# Patient Record
Sex: FEMALE | Race: OTHER | HISPANIC OR LATINO
[De-identification: names, ages, dates, MRNs, and addresses within clinical notes are randomized per-mention and may not be internally consistent; named-entity substitution may affect disease eponyms.]

---

## 2017-01-23 ENCOUNTER — RX RENEWAL (OUTPATIENT)
Age: 67
End: 2017-01-23

## 2017-02-13 ENCOUNTER — RECORD ABSTRACTING (OUTPATIENT)
Age: 67
End: 2017-02-13

## 2017-02-13 DIAGNOSIS — D64.9 ANEMIA, UNSPECIFIED: ICD-10-CM

## 2017-02-13 DIAGNOSIS — Z83.2 FAMILY HISTORY OF DISEASES OF THE BLOOD AND BLOOD-FORMING ORGANS AND CERTAIN DISORDERS INVOLVING THE IMMUNE MECHANISM: ICD-10-CM

## 2017-03-21 ENCOUNTER — APPOINTMENT (OUTPATIENT)
Dept: GASTROENTEROLOGY | Facility: GI CENTER | Age: 67
End: 2017-03-21

## 2017-04-11 ENCOUNTER — APPOINTMENT (OUTPATIENT)
Dept: GASTROENTEROLOGY | Facility: GI CENTER | Age: 67
End: 2017-04-11

## 2017-04-14 ENCOUNTER — APPOINTMENT (OUTPATIENT)
Dept: PULMONOLOGY | Facility: CLINIC | Age: 67
End: 2017-04-14

## 2017-04-14 VITALS — HEIGHT: 58.2 IN | BODY MASS INDEX: 28.23 KG/M2 | DIASTOLIC BLOOD PRESSURE: 68 MMHG | SYSTOLIC BLOOD PRESSURE: 112 MMHG

## 2017-04-14 VITALS — WEIGHT: 136 LBS | BODY MASS INDEX: 28.18 KG/M2

## 2017-05-25 ENCOUNTER — APPOINTMENT (OUTPATIENT)
Dept: GASTROENTEROLOGY | Facility: GI CENTER | Age: 67
End: 2017-05-25

## 2017-06-19 ENCOUNTER — RX RENEWAL (OUTPATIENT)
Age: 67
End: 2017-06-19

## 2017-06-26 ENCOUNTER — RX RENEWAL (OUTPATIENT)
Age: 67
End: 2017-06-26

## 2017-06-27 ENCOUNTER — RX RENEWAL (OUTPATIENT)
Age: 67
End: 2017-06-27

## 2017-06-29 ENCOUNTER — APPOINTMENT (OUTPATIENT)
Dept: GASTROENTEROLOGY | Facility: CLINIC | Age: 67
End: 2017-06-29

## 2017-06-29 VITALS
WEIGHT: 136 LBS | HEART RATE: 79 BPM | DIASTOLIC BLOOD PRESSURE: 66 MMHG | BODY MASS INDEX: 28.55 KG/M2 | OXYGEN SATURATION: 98 % | HEIGHT: 58 IN | SYSTOLIC BLOOD PRESSURE: 91 MMHG

## 2017-07-16 ENCOUNTER — OUTPATIENT (OUTPATIENT)
Dept: OUTPATIENT SERVICES | Facility: HOSPITAL | Age: 67
LOS: 1 days | End: 2017-07-16
Payer: MEDICARE

## 2017-07-16 ENCOUNTER — APPOINTMENT (OUTPATIENT)
Dept: MRI IMAGING | Facility: CLINIC | Age: 67
End: 2017-07-16

## 2017-07-16 DIAGNOSIS — K74.60 UNSPECIFIED CIRRHOSIS OF LIVER: ICD-10-CM

## 2017-07-16 DIAGNOSIS — Z00.8 ENCOUNTER FOR OTHER GENERAL EXAMINATION: ICD-10-CM

## 2017-07-16 PROCEDURE — A9585: CPT

## 2017-07-16 PROCEDURE — 74183 MRI ABD W/O CNTR FLWD CNTR: CPT

## 2017-07-27 ENCOUNTER — OUTPATIENT (OUTPATIENT)
Dept: OUTPATIENT SERVICES | Facility: HOSPITAL | Age: 67
LOS: 1 days | End: 2017-07-27
Payer: MEDICARE

## 2017-07-27 VITALS
WEIGHT: 132.28 LBS | RESPIRATION RATE: 16 BRPM | DIASTOLIC BLOOD PRESSURE: 55 MMHG | TEMPERATURE: 98 F | HEIGHT: 58 IN | HEART RATE: 75 BPM | SYSTOLIC BLOOD PRESSURE: 100 MMHG

## 2017-07-27 DIAGNOSIS — Z98.890 OTHER SPECIFIED POSTPROCEDURAL STATES: Chronic | ICD-10-CM

## 2017-07-27 DIAGNOSIS — Z01.818 ENCOUNTER FOR OTHER PREPROCEDURAL EXAMINATION: ICD-10-CM

## 2017-07-27 DIAGNOSIS — D50.0 IRON DEFICIENCY ANEMIA SECONDARY TO BLOOD LOSS (CHRONIC): ICD-10-CM

## 2017-07-27 LAB
ALBUMIN SERPL ELPH-MCNC: 3.8 G/DL — SIGNIFICANT CHANGE UP (ref 3.3–5.2)
ALP SERPL-CCNC: 153 U/L — HIGH (ref 40–120)
ALT FLD-CCNC: 19 U/L — SIGNIFICANT CHANGE UP
ANION GAP SERPL CALC-SCNC: 12 MMOL/L — SIGNIFICANT CHANGE UP (ref 5–17)
APTT BLD: 47.7 SEC — HIGH (ref 27.5–37.4)
AST SERPL-CCNC: 25 U/L — SIGNIFICANT CHANGE UP
BILIRUB SERPL-MCNC: 1.1 MG/DL — SIGNIFICANT CHANGE UP (ref 0.4–2)
BUN SERPL-MCNC: 17 MG/DL — SIGNIFICANT CHANGE UP (ref 8–20)
CALCIUM SERPL-MCNC: 9.1 MG/DL — SIGNIFICANT CHANGE UP (ref 8.6–10.2)
CHLORIDE SERPL-SCNC: 101 MMOL/L — SIGNIFICANT CHANGE UP (ref 98–107)
CO2 SERPL-SCNC: 25 MMOL/L — SIGNIFICANT CHANGE UP (ref 22–29)
CREAT SERPL-MCNC: 0.47 MG/DL — LOW (ref 0.5–1.3)
GLUCOSE SERPL-MCNC: 84 MG/DL — SIGNIFICANT CHANGE UP (ref 70–115)
HCT VFR BLD CALC: 28.7 % — LOW (ref 37–47)
HGB BLD-MCNC: 9 G/DL — LOW (ref 12–16)
INR BLD: 1.15 RATIO — SIGNIFICANT CHANGE UP (ref 0.88–1.16)
MCHC RBC-ENTMCNC: 28.5 PG — SIGNIFICANT CHANGE UP (ref 27–31)
MCHC RBC-ENTMCNC: 31.4 G/DL — LOW (ref 32–36)
MCV RBC AUTO: 90.8 FL — SIGNIFICANT CHANGE UP (ref 81–99)
PLATELET # BLD AUTO: 238 K/UL — SIGNIFICANT CHANGE UP (ref 150–400)
POTASSIUM SERPL-MCNC: 4.4 MMOL/L — SIGNIFICANT CHANGE UP (ref 3.5–5.3)
POTASSIUM SERPL-SCNC: 4.4 MMOL/L — SIGNIFICANT CHANGE UP (ref 3.5–5.3)
PROT SERPL-MCNC: 8.7 G/DL — SIGNIFICANT CHANGE UP (ref 6.6–8.7)
PROTHROM AB SERPL-ACNC: 12.7 SEC — SIGNIFICANT CHANGE UP (ref 9.8–12.7)
RBC # BLD: 3.16 M/UL — LOW (ref 4.4–5.2)
RBC # FLD: 15.5 % — SIGNIFICANT CHANGE UP (ref 11–15.6)
SODIUM SERPL-SCNC: 138 MMOL/L — SIGNIFICANT CHANGE UP (ref 135–145)
WBC # BLD: 3.9 K/UL — LOW (ref 4.8–10.8)
WBC # FLD AUTO: 3.9 K/UL — LOW (ref 4.8–10.8)

## 2017-07-27 PROCEDURE — G0463: CPT

## 2017-07-27 PROCEDURE — 93010 ELECTROCARDIOGRAM REPORT: CPT

## 2017-07-27 PROCEDURE — 93005 ELECTROCARDIOGRAM TRACING: CPT

## 2017-07-27 PROCEDURE — T1013: CPT

## 2017-07-27 PROCEDURE — 80053 COMPREHEN METABOLIC PANEL: CPT

## 2017-07-27 PROCEDURE — 85027 COMPLETE CBC AUTOMATED: CPT

## 2017-07-27 PROCEDURE — 85610 PROTHROMBIN TIME: CPT

## 2017-07-27 PROCEDURE — 36415 COLL VENOUS BLD VENIPUNCTURE: CPT

## 2017-07-27 PROCEDURE — 85730 THROMBOPLASTIN TIME PARTIAL: CPT

## 2017-07-27 RX ORDER — SUCRALFATE 1 G
10 TABLET ORAL
Qty: 0 | Refills: 0 | COMMUNITY

## 2017-07-27 RX ORDER — FUROSEMIDE 40 MG
1 TABLET ORAL
Qty: 0 | Refills: 0 | COMMUNITY

## 2017-07-27 RX ORDER — FOLIC ACID 0.8 MG
1 TABLET ORAL
Qty: 0 | Refills: 0 | COMMUNITY

## 2017-07-27 RX ORDER — LEVOCETIRIZINE DIHYDROCHLORIDE 0.5 MG/ML
1 SOLUTION ORAL
Qty: 0 | Refills: 0 | COMMUNITY

## 2017-07-27 RX ORDER — OMEPRAZOLE 10 MG/1
1 CAPSULE, DELAYED RELEASE ORAL
Qty: 0 | Refills: 0 | COMMUNITY

## 2017-07-27 RX ORDER — SPIRONOLACTONE 25 MG/1
1 TABLET, FILM COATED ORAL
Qty: 0 | Refills: 0 | COMMUNITY

## 2017-07-27 RX ORDER — CARVEDILOL PHOSPHATE 80 MG/1
1 CAPSULE, EXTENDED RELEASE ORAL
Qty: 0 | Refills: 0 | COMMUNITY

## 2017-07-27 RX ORDER — LEVOTHYROXINE SODIUM 125 MCG
1 TABLET ORAL
Qty: 0 | Refills: 0 | COMMUNITY

## 2017-07-27 RX ORDER — BUDESONIDE AND FORMOTEROL FUMARATE DIHYDRATE 160; 4.5 UG/1; UG/1
2 AEROSOL RESPIRATORY (INHALATION)
Qty: 0 | Refills: 0 | COMMUNITY

## 2017-07-27 RX ORDER — ALBUTEROL 90 UG/1
2 AEROSOL, METERED ORAL
Qty: 0 | Refills: 0 | COMMUNITY

## 2017-07-27 NOTE — H&P PST ADULT - NEGATIVE GENERAL SYMPTOMS
no chills/no fatigue/no sweating/no fever/no polyphagia/no polyuria/no malaise/no polydipsia/no anorexia/no weight loss/no weight gain

## 2017-07-27 NOTE — H&P PST ADULT - NEGATIVE CARDIOVASCULAR SYMPTOMS
no claudication/no chest pain/no orthopnea/no dyspnea on exertion/no palpitations/no paroxysmal nocturnal dyspnea/no peripheral edema

## 2017-07-27 NOTE — H&P PST ADULT - NEGATIVE FEMALE-SPECIFIC SYMPTOMS
not applicable/no pelvic pain/no abnormal vaginal bleeding/no dysmenorrhea/no vaginal discharge/no menorrhagia/no irregular menses/no amenorrhea/no spotting

## 2017-07-27 NOTE — H&P PST ADULT - NEUROLOGICAL DETAILS
no spontaneous movement/responds to pain/deep reflexes intact/normal strength/sensation intact/cranial nerves intact/responds to verbal commands/superficial reflexes intact/alert and oriented x 3

## 2017-07-27 NOTE — H&P PST ADULT - NEGATIVE MUSCULOSKELETAL SYMPTOMS
no arthralgia/no myalgia/no back pain/no joint swelling/no arm pain L/no arm pain R/no leg pain R/no neck pain/no muscle weakness/no leg pain L/no muscle cramps

## 2017-07-27 NOTE — H&P PST ADULT - NEGATIVE ENMT SYMPTOMS
no nasal congestion/no post-nasal discharge/no nasal obstruction/no ear pain/no dysphagia/no gum bleeding/no abnormal taste sensation/no vertigo/no recurrent cold sores/no sinus symptoms/no tinnitus/no nasal discharge/no hearing difficulty/no throat pain/no nose bleeds/no dry mouth

## 2017-07-27 NOTE — H&P PST ADULT - NSANTHOSAYNRD_GEN_A_CORE
No. JAKY screening performed.  STOP BANG Legend: 0-2 = LOW Risk; 3-4 = INTERMEDIATE Risk; 5-8 = HIGH Risk

## 2017-07-27 NOTE — H&P PST ADULT - NEGATIVE PSYCHIATRIC SYMPTOMS
no paranoia/no suicidal ideation/no memory loss/no mood swings/no hyperactivity/no agitation/no visual hallucinations/no auditory hallucinations/no anxiety/no insomnia

## 2017-07-27 NOTE — H&P PST ADULT - NEGATIVE NEUROLOGICAL SYMPTOMS
no vertigo/no facial palsy/no focal seizures/no tremors/no loss of sensation/no confusion/no syncope/no paresthesias/no headache/no transient paralysis/no generalized seizures/no loss of consciousness/no hemiparesis/no difficulty walking/no weakness

## 2017-07-27 NOTE — H&P PST ADULT - PMH
Anemia    Asthma    GERD with esophagitis    Hypertension    Hypothyroid    Liver cirrhosis Anemia    Ascites of liver    Asthma    Depression    GERD with esophagitis    Hepatitis B    Hypertension    Hypothyroid    Liver cirrhosis Anemia    Ascites of liver    Asthma    Depression    Gastric varices    GERD with esophagitis    Hepatitis B    Hypertension    Hypothyroid    Liver cirrhosis    JAKY (obstructive sleep apnea)

## 2017-07-27 NOTE — H&P PST ADULT - RS GEN PE MLT RESP DETAILS PC
no rales/no wheezes/normal/breath sounds equal/no chest wall tenderness/airway patent/good air movement/no rhonchi/respirations non-labored/clear to auscultation bilaterally/no intercostal retractions/no subcutaneous emphysema

## 2017-07-27 NOTE — H&P PST ADULT - NEGATIVE GASTROINTESTINAL SYMPTOMS
no flatulence/no diarrhea/no jaundice/no constipation/no melena/no change in bowel habits/no vomiting/no hematochezia/no abdominal pain/no nausea/no steatorrhea/no hiccoughs

## 2017-07-27 NOTE — H&P PST ADULT - GASTROINTESTINAL DETAILS
no rigidity/no masses palpable/no bruit/no guarding/no rebound tenderness/bowel sounds normal/no distention/soft/normal/nontender/no organomegaly

## 2017-07-27 NOTE — H&P PST ADULT - NEGATIVE GENERAL GENITOURINARY SYMPTOMS
no dysuria/no incontinence/no renal colic/no flank pain R/no hematuria/normal libido/no bladder infections/no urine discoloration/no nocturia/no flank pain L/no gas in urine/no urinary hesitancy

## 2017-07-27 NOTE — H&P PST ADULT - NEGATIVE BREAST SYMPTOMS
no breast tenderness L/no nipple discharge R/no breast tenderness R/no breast lump R/no breast lump L/no nipple discharge L

## 2017-07-27 NOTE — H&P PST ADULT - NEGATIVE OPHTHALMOLOGIC SYMPTOMS
no blurred vision R/no blurred vision L/no loss of vision R/no scleral injection L/no loss of vision L/no irritation L/no irritation R/no pain L/no lacrimation R/no discharge L/no photophobia/no pain R/no discharge R/no diplopia/no scleral injection R/no lacrimation L

## 2017-07-27 NOTE — H&P PST ADULT - FAMILY HISTORY
Father  Still living? No  Family history of Parkinson's disease, Age at diagnosis: Age Unknown     Mother  Still living? Yes, Estimated age: 81-90  Family history of hyperlipidemia, Age at diagnosis: Age Unknown

## 2017-07-27 NOTE — H&P PST ADULT - MUSCULOSKELETAL
details… detailed exam no joint swelling/normal strength/ROM intact/normal/no calf tenderness/no joint warmth/no joint erythema

## 2017-08-03 ENCOUNTER — RESULT REVIEW (OUTPATIENT)
Age: 67
End: 2017-08-03

## 2017-08-03 ENCOUNTER — APPOINTMENT (OUTPATIENT)
Dept: GASTROENTEROLOGY | Facility: HOSPITAL | Age: 67
End: 2017-08-03

## 2017-08-03 ENCOUNTER — OUTPATIENT (OUTPATIENT)
Dept: OUTPATIENT SERVICES | Facility: HOSPITAL | Age: 67
LOS: 1 days | End: 2017-08-03
Payer: MEDICARE

## 2017-08-03 ENCOUNTER — TRANSCRIPTION ENCOUNTER (OUTPATIENT)
Age: 67
End: 2017-08-03

## 2017-08-03 DIAGNOSIS — D50.0 IRON DEFICIENCY ANEMIA SECONDARY TO BLOOD LOSS (CHRONIC): ICD-10-CM

## 2017-08-03 DIAGNOSIS — Z98.890 OTHER SPECIFIED POSTPROCEDURAL STATES: Chronic | ICD-10-CM

## 2017-08-03 DIAGNOSIS — K64.8 OTHER HEMORRHOIDS: ICD-10-CM

## 2017-08-03 DIAGNOSIS — K57.30 DIVERTICULOSIS OF LARGE INTESTINE W/OUT PERFORATION OR ABSCESS W/OUT BLEEDING: ICD-10-CM

## 2017-08-03 PROCEDURE — 88305 TISSUE EXAM BY PATHOLOGIST: CPT | Mod: 26

## 2017-08-03 PROCEDURE — 45385 COLONOSCOPY W/LESION REMOVAL: CPT

## 2017-08-03 PROCEDURE — T1013: CPT

## 2017-08-03 PROCEDURE — 88305 TISSUE EXAM BY PATHOLOGIST: CPT

## 2017-08-03 PROCEDURE — 43239 EGD BIOPSY SINGLE/MULTIPLE: CPT

## 2017-08-03 PROCEDURE — 43235 EGD DIAGNOSTIC BRUSH WASH: CPT | Mod: 59

## 2017-08-04 LAB — SURGICAL PATHOLOGY FINAL REPORT - CH: SIGNIFICANT CHANGE UP

## 2017-08-07 ENCOUNTER — RX RENEWAL (OUTPATIENT)
Age: 67
End: 2017-08-07

## 2017-08-22 ENCOUNTER — RX RENEWAL (OUTPATIENT)
Age: 67
End: 2017-08-22

## 2017-09-14 ENCOUNTER — APPOINTMENT (OUTPATIENT)
Dept: GASTROENTEROLOGY | Facility: CLINIC | Age: 67
End: 2017-09-14
Payer: MEDICARE

## 2017-09-14 VITALS
RESPIRATION RATE: 16 BRPM | HEIGHT: 58 IN | OXYGEN SATURATION: 100 % | BODY MASS INDEX: 28.13 KG/M2 | SYSTOLIC BLOOD PRESSURE: 98 MMHG | WEIGHT: 134 LBS | DIASTOLIC BLOOD PRESSURE: 67 MMHG

## 2017-09-14 PROCEDURE — 99214 OFFICE O/P EST MOD 30 MIN: CPT

## 2017-09-14 RX ORDER — INHALER, ASSIST DEVICES
SPACER (EA) MISCELLANEOUS
Qty: 1 | Refills: 0 | Status: ACTIVE | COMMUNITY
Start: 2017-08-01

## 2017-10-06 ENCOUNTER — RX RENEWAL (OUTPATIENT)
Age: 67
End: 2017-10-06

## 2017-10-19 ENCOUNTER — APPOINTMENT (OUTPATIENT)
Dept: PULMONOLOGY | Facility: CLINIC | Age: 67
End: 2017-10-19
Payer: MEDICARE

## 2017-10-19 VITALS — WEIGHT: 131 LBS | BODY MASS INDEX: 27.38 KG/M2

## 2017-10-19 VITALS — DIASTOLIC BLOOD PRESSURE: 58 MMHG | HEART RATE: 81 BPM | SYSTOLIC BLOOD PRESSURE: 96 MMHG | OXYGEN SATURATION: 96 %

## 2017-10-19 DIAGNOSIS — R94.2 ABNORMAL RESULTS OF PULMONARY FUNCTION STUDIES: ICD-10-CM

## 2017-10-19 PROCEDURE — 99214 OFFICE O/P EST MOD 30 MIN: CPT | Mod: 25

## 2017-10-19 PROCEDURE — 94010 BREATHING CAPACITY TEST: CPT

## 2017-10-19 RX ORDER — POLYETHYLENE GLYOCOL 3350, SODIUM CHLORIDE, SODIUM BICARBONATE AND POTASSIUM CHLORIDE 420; 11.2; 5.72; 1.48 G/4L; G/4L; G/4L; G/4L
420 POWDER, FOR SOLUTION NASOGASTRIC; ORAL
Qty: 1 | Refills: 0 | Status: DISCONTINUED | COMMUNITY
Start: 2017-06-29 | End: 2017-10-19

## 2017-10-19 RX ORDER — PREDNISONE 10 MG/1
10 TABLET ORAL
Qty: 30 | Refills: 0 | Status: DISCONTINUED | COMMUNITY
Start: 2017-08-11 | End: 2017-10-19

## 2017-12-22 ENCOUNTER — APPOINTMENT (OUTPATIENT)
Dept: GASTROENTEROLOGY | Facility: CLINIC | Age: 67
End: 2017-12-22
Payer: MEDICARE

## 2017-12-22 VITALS
WEIGHT: 131 LBS | BODY MASS INDEX: 27.5 KG/M2 | SYSTOLIC BLOOD PRESSURE: 111 MMHG | DIASTOLIC BLOOD PRESSURE: 72 MMHG | HEART RATE: 73 BPM | HEIGHT: 58 IN

## 2017-12-22 PROCEDURE — 99215 OFFICE O/P EST HI 40 MIN: CPT

## 2017-12-22 RX ORDER — OMEPRAZOLE 20 MG/1
20 CAPSULE, DELAYED RELEASE ORAL TWICE DAILY
Qty: 60 | Refills: 5 | Status: DISCONTINUED | COMMUNITY
Start: 2017-08-07 | End: 2017-12-22

## 2018-01-09 ENCOUNTER — APPOINTMENT (OUTPATIENT)
Dept: GASTROENTEROLOGY | Facility: CLINIC | Age: 68
End: 2018-01-09
Payer: MEDICARE

## 2018-01-09 VITALS
WEIGHT: 130 LBS | HEART RATE: 78 BPM | BODY MASS INDEX: 27.29 KG/M2 | RESPIRATION RATE: 14 BRPM | HEIGHT: 58 IN | SYSTOLIC BLOOD PRESSURE: 113 MMHG | DIASTOLIC BLOOD PRESSURE: 78 MMHG

## 2018-01-09 PROCEDURE — 99214 OFFICE O/P EST MOD 30 MIN: CPT

## 2018-01-18 ENCOUNTER — APPOINTMENT (OUTPATIENT)
Dept: ULTRASOUND IMAGING | Facility: CLINIC | Age: 68
End: 2018-01-18
Payer: MEDICARE

## 2018-01-18 ENCOUNTER — OUTPATIENT (OUTPATIENT)
Dept: OUTPATIENT SERVICES | Facility: HOSPITAL | Age: 68
LOS: 1 days | End: 2018-01-18
Payer: MEDICARE

## 2018-01-18 DIAGNOSIS — Z98.890 OTHER SPECIFIED POSTPROCEDURAL STATES: Chronic | ICD-10-CM

## 2018-01-18 DIAGNOSIS — K74.60 UNSPECIFIED CIRRHOSIS OF LIVER: ICD-10-CM

## 2018-01-18 PROCEDURE — 93975 VASCULAR STUDY: CPT

## 2018-01-18 PROCEDURE — 93975 VASCULAR STUDY: CPT | Mod: 26

## 2018-01-22 ENCOUNTER — RX RENEWAL (OUTPATIENT)
Age: 68
End: 2018-01-22

## 2018-02-01 ENCOUNTER — APPOINTMENT (OUTPATIENT)
Dept: GASTROENTEROLOGY | Facility: CLINIC | Age: 68
End: 2018-02-01
Payer: MEDICARE

## 2018-02-01 VITALS
BODY MASS INDEX: 27.29 KG/M2 | HEIGHT: 58 IN | RESPIRATION RATE: 16 BRPM | WEIGHT: 130 LBS | SYSTOLIC BLOOD PRESSURE: 110 MMHG | HEART RATE: 88 BPM | DIASTOLIC BLOOD PRESSURE: 73 MMHG | OXYGEN SATURATION: 96 %

## 2018-02-01 PROCEDURE — 99214 OFFICE O/P EST MOD 30 MIN: CPT

## 2018-02-01 RX ORDER — LEVOCETIRIZINE DIHYDROCHLORIDE 5 MG/1
5 TABLET ORAL
Qty: 90 | Refills: 0 | Status: COMPLETED | COMMUNITY
Start: 2017-08-07 | End: 2018-02-01

## 2018-02-06 ENCOUNTER — OTHER (OUTPATIENT)
Age: 68
End: 2018-02-06

## 2018-02-14 ENCOUNTER — APPOINTMENT (OUTPATIENT)
Dept: INTERVENTIONAL RADIOLOGY/VASCULAR | Facility: CLINIC | Age: 68
End: 2018-02-14
Payer: MEDICARE

## 2018-02-14 ENCOUNTER — OUTPATIENT (OUTPATIENT)
Dept: OUTPATIENT SERVICES | Facility: HOSPITAL | Age: 68
LOS: 1 days | End: 2018-02-14

## 2018-02-14 DIAGNOSIS — Z98.890 OTHER SPECIFIED POSTPROCEDURAL STATES: Chronic | ICD-10-CM

## 2018-02-14 DIAGNOSIS — Z00.8 ENCOUNTER FOR OTHER GENERAL EXAMINATION: ICD-10-CM

## 2018-02-14 PROCEDURE — 76705 ECHO EXAM OF ABDOMEN: CPT | Mod: 26

## 2018-04-12 ENCOUNTER — APPOINTMENT (OUTPATIENT)
Dept: PULMONOLOGY | Facility: CLINIC | Age: 68
End: 2018-04-12

## 2018-04-23 ENCOUNTER — MEDICATION RENEWAL (OUTPATIENT)
Age: 68
End: 2018-04-23

## 2018-04-25 ENCOUNTER — APPOINTMENT (OUTPATIENT)
Dept: PULMONOLOGY | Facility: CLINIC | Age: 68
End: 2018-04-25
Payer: MEDICARE

## 2018-04-25 VITALS — BODY MASS INDEX: 26.75 KG/M2 | DIASTOLIC BLOOD PRESSURE: 66 MMHG | SYSTOLIC BLOOD PRESSURE: 108 MMHG | WEIGHT: 128 LBS

## 2018-04-25 VITALS — OXYGEN SATURATION: 97 % | HEART RATE: 71 BPM

## 2018-04-25 DIAGNOSIS — K43.9 VENTRAL HERNIA W/OUT OBSTRUCTION OR GANGRENE: ICD-10-CM

## 2018-04-25 PROCEDURE — 99214 OFFICE O/P EST MOD 30 MIN: CPT

## 2018-04-25 RX ORDER — TRIAMCINOLONE ACETONIDE 0.25 MG/G
0.03 CREAM TOPICAL
Qty: 454 | Refills: 0 | Status: DISCONTINUED | COMMUNITY
Start: 2017-07-31 | End: 2018-04-25

## 2018-05-03 ENCOUNTER — OTHER (OUTPATIENT)
Age: 68
End: 2018-05-03

## 2018-05-17 ENCOUNTER — APPOINTMENT (OUTPATIENT)
Dept: INTERVENTIONAL RADIOLOGY/VASCULAR | Facility: CLINIC | Age: 68
End: 2018-05-17

## 2018-08-23 ENCOUNTER — APPOINTMENT (OUTPATIENT)
Dept: GASTROENTEROLOGY | Facility: CLINIC | Age: 68
End: 2018-08-23
Payer: MEDICARE

## 2018-08-23 ENCOUNTER — TRANSCRIPTION ENCOUNTER (OUTPATIENT)
Age: 68
End: 2018-08-23

## 2018-08-23 VITALS
OXYGEN SATURATION: 98 % | DIASTOLIC BLOOD PRESSURE: 60 MMHG | WEIGHT: 128 LBS | HEIGHT: 58 IN | SYSTOLIC BLOOD PRESSURE: 100 MMHG | HEART RATE: 70 BPM | RESPIRATION RATE: 15 BRPM | BODY MASS INDEX: 26.87 KG/M2

## 2018-08-23 DIAGNOSIS — K21.9 GASTRO-ESOPHAGEAL REFLUX DISEASE W/OUT ESOPHAGITIS: ICD-10-CM

## 2018-08-23 PROBLEM — D64.9 ANEMIA, UNSPECIFIED: Chronic | Status: ACTIVE | Noted: 2017-07-27

## 2018-08-23 PROBLEM — E03.9 HYPOTHYROIDISM, UNSPECIFIED: Chronic | Status: ACTIVE | Noted: 2017-07-27

## 2018-08-23 PROBLEM — I86.4 GASTRIC VARICES: Chronic | Status: ACTIVE | Noted: 2017-07-27

## 2018-08-23 PROBLEM — B19.10 UNSPECIFIED VIRAL HEPATITIS B WITHOUT HEPATIC COMA: Chronic | Status: ACTIVE | Noted: 2017-07-27

## 2018-08-23 PROBLEM — G47.33 OBSTRUCTIVE SLEEP APNEA (ADULT) (PEDIATRIC): Chronic | Status: ACTIVE | Noted: 2017-07-27

## 2018-08-23 PROBLEM — J45.909 UNSPECIFIED ASTHMA, UNCOMPLICATED: Chronic | Status: ACTIVE | Noted: 2017-07-27

## 2018-08-23 PROBLEM — K21.0 GASTRO-ESOPHAGEAL REFLUX DISEASE WITH ESOPHAGITIS: Chronic | Status: ACTIVE | Noted: 2017-07-27

## 2018-08-23 PROBLEM — I10 ESSENTIAL (PRIMARY) HYPERTENSION: Chronic | Status: ACTIVE | Noted: 2017-07-27

## 2018-08-23 PROBLEM — R18.8 OTHER ASCITES: Chronic | Status: ACTIVE | Noted: 2017-07-27

## 2018-08-23 PROBLEM — F32.9 MAJOR DEPRESSIVE DISORDER, SINGLE EPISODE, UNSPECIFIED: Chronic | Status: ACTIVE | Noted: 2017-07-27

## 2018-08-23 PROBLEM — K74.60 UNSPECIFIED CIRRHOSIS OF LIVER: Chronic | Status: ACTIVE | Noted: 2017-07-27

## 2018-08-23 PROCEDURE — 99214 OFFICE O/P EST MOD 30 MIN: CPT

## 2018-08-23 RX ORDER — RIVAROXABAN 20 MG/1
20 TABLET, FILM COATED ORAL
Refills: 0 | Status: ACTIVE | COMMUNITY

## 2018-10-08 ENCOUNTER — APPOINTMENT (OUTPATIENT)
Dept: PULMONOLOGY | Facility: CLINIC | Age: 68
End: 2018-10-08
Payer: MEDICARE

## 2018-10-08 VITALS — HEART RATE: 80 BPM | OXYGEN SATURATION: 98 %

## 2018-10-08 VITALS — DIASTOLIC BLOOD PRESSURE: 62 MMHG | SYSTOLIC BLOOD PRESSURE: 108 MMHG

## 2018-10-08 VITALS — WEIGHT: 138 LBS | HEIGHT: 58 IN | BODY MASS INDEX: 28.97 KG/M2

## 2018-10-08 PROCEDURE — 94010 BREATHING CAPACITY TEST: CPT

## 2018-10-08 PROCEDURE — 99215 OFFICE O/P EST HI 40 MIN: CPT | Mod: 25

## 2018-11-01 ENCOUNTER — OUTPATIENT (OUTPATIENT)
Dept: OUTPATIENT SERVICES | Facility: HOSPITAL | Age: 68
LOS: 1 days | End: 2018-11-01

## 2018-11-01 ENCOUNTER — APPOINTMENT (OUTPATIENT)
Dept: INTERVENTIONAL RADIOLOGY/VASCULAR | Facility: CLINIC | Age: 68
End: 2018-11-01
Payer: MEDICARE

## 2018-11-01 ENCOUNTER — RESULT REVIEW (OUTPATIENT)
Age: 68
End: 2018-11-01

## 2018-11-01 DIAGNOSIS — Z98.890 OTHER SPECIFIED POSTPROCEDURAL STATES: Chronic | ICD-10-CM

## 2018-11-01 DIAGNOSIS — J90 PLEURAL EFFUSION, NOT ELSEWHERE CLASSIFIED: ICD-10-CM

## 2018-11-01 PROCEDURE — 32555 ASPIRATE PLEURA W/ IMAGING: CPT | Mod: RT

## 2018-11-01 PROCEDURE — 71045 X-RAY EXAM CHEST 1 VIEW: CPT | Mod: 26

## 2018-11-12 ENCOUNTER — APPOINTMENT (OUTPATIENT)
Dept: PULMONOLOGY | Facility: CLINIC | Age: 68
End: 2018-11-12
Payer: MEDICARE

## 2018-11-12 VITALS — HEART RATE: 100 BPM | DIASTOLIC BLOOD PRESSURE: 58 MMHG | OXYGEN SATURATION: 97 % | SYSTOLIC BLOOD PRESSURE: 98 MMHG

## 2018-11-12 PROCEDURE — 99214 OFFICE O/P EST MOD 30 MIN: CPT

## 2018-11-13 ENCOUNTER — APPOINTMENT (OUTPATIENT)
Dept: GASTROENTEROLOGY | Facility: CLINIC | Age: 68
End: 2018-11-13
Payer: MEDICARE

## 2018-11-13 VITALS
SYSTOLIC BLOOD PRESSURE: 104 MMHG | HEART RATE: 83 BPM | DIASTOLIC BLOOD PRESSURE: 67 MMHG | BODY MASS INDEX: 28.97 KG/M2 | WEIGHT: 138 LBS | HEIGHT: 58 IN

## 2018-11-13 PROCEDURE — 99214 OFFICE O/P EST MOD 30 MIN: CPT

## 2018-12-05 ENCOUNTER — RX RENEWAL (OUTPATIENT)
Age: 68
End: 2018-12-05

## 2018-12-27 ENCOUNTER — OTHER (OUTPATIENT)
Age: 68
End: 2018-12-27

## 2019-01-08 ENCOUNTER — APPOINTMENT (OUTPATIENT)
Dept: GASTROENTEROLOGY | Facility: CLINIC | Age: 69
End: 2019-01-08
Payer: MEDICARE

## 2019-01-08 ENCOUNTER — RX RENEWAL (OUTPATIENT)
Age: 69
End: 2019-01-08

## 2019-01-08 VITALS
WEIGHT: 140 LBS | DIASTOLIC BLOOD PRESSURE: 69 MMHG | HEIGHT: 58 IN | SYSTOLIC BLOOD PRESSURE: 120 MMHG | HEART RATE: 85 BPM | BODY MASS INDEX: 29.39 KG/M2

## 2019-01-08 PROCEDURE — 99214 OFFICE O/P EST MOD 30 MIN: CPT

## 2019-01-08 NOTE — HISTORY OF PRESENT ILLNESS
[None] : had no significant interval events [Heartburn] : denies heartburn [Nausea] : denies nausea [Vomiting] : denies vomiting [Diarrhea] : denies diarrhea [Constipation] : denies constipation [Yellow Skin Or Eyes (Jaundice)] : denies jaundice [Abdominal Pain] : denies abdominal pain [Abdominal Swelling] : denies abdominal swelling [Rectal Pain] : denies rectal pain [Wt Gain ___ Lbs] : no recent weight gain [Wt Loss ___ Lbs] : no recent weight loss [GERD] : no gastroesophageal reflux disease [Hiatus Hernia] : no hiatus hernia [Peptic Ulcer Disease] : no peptic ulcer disease [Pancreatitis] : no pancreatitis [Cholelithiasis] : no cholelithiasis [Kidney Stone] : no kidney stone [Inflammatory Bowel Disease] : no inflammatory bowel disease [Irritable Bowel Syndrome] : no irritable bowel syndrome [Diverticulitis] : no diverticulitis [Alcohol Abuse] : no alcohol abuse [Malignancy] : no malignancy [Abdominal Surgery] : no abdominal surgery [Appendectomy] : no appendectomy [Cholecystectomy] : no cholecystectomy [de-identified] : November 2018 [de-identified] : ordering lab work and renewing medications [de-identified] : Patient presents today for followup evaluation of worsening iron deficiency anemia with a drop in her hemoglobin from 8.5 g to 7.9 g since November of 2018 without associated gross hematochezia or melena or nausea or vomiting or hematemesis. Her last endoscopic evaluation was done in August of 2017 where she had both an upper endoscopy and colonoscopy with polypectomy of 2 small diminutive tubular adenomas. Her upper endoscopy revealed esophageal varices without stigmata of hemorrhage and with no evidence of gastric varices. She is on nonselective beta blockade for her esophageal varices and has no lower GI symptoms or complaints at this time.

## 2019-01-08 NOTE — ASSESSMENT
[FreeTextEntry1] : Impression: Worsening iron deficiency anemia in a patient with known decompensated cirrhosis secondary to nonalcoholic fatty liver disease with no obvious source of GI bleeding rule out possible bleeding esophageal varices and/or lower GI varices.\par \par Recommendations: Upper endoscopy and colonoscopy were advised for further evaluation of the above. These will be done at [facility] on the same day because of her history of decompensated cirrhosis. The risks versus benefits of upper endoscopy and colonoscopy, the one in 1000 risk of possible endoscopic and/or colonic perforation, the risk of possible post biopsy or polypectomy bleeding, the risk of possible respiratory suppression with intravenous sedation, and alternative testing such as upper GI series and/or virtual colonoscopy, were individually explained to both patient and her son today who accompanied the patient both of whom appeared to understand all of the above and were agreeable to proceeding with repeat upper endoscopy and colonoscopy. Her ASA classification is 3. She will be prepped with [facility] for the colonoscopy an is medically optimized for both upper endoscopy and colonoscopy. Both the patient and her son appeared to understand all of the above instructions and management plan.\par

## 2019-01-08 NOTE — PHYSICAL EXAM
[General Appearance - Alert] : alert [General Appearance - In No Acute Distress] : in no acute distress [General Appearance - Well Nourished] : well nourished [General Appearance - Well Developed] : well developed [General Appearance - Well-Appearing] : healthy appearing [Sclera] : the sclera and conjunctiva were normal [PERRL With Normal Accommodation] : pupils were equal in size, round, and reactive to light [Extraocular Movements] : extraocular movements were intact [Outer Ear] : the ears and nose were normal in appearance [Examination Of The Oral Cavity] : the lips and gums were normal [Oropharynx] : the oropharynx was normal [Neck Appearance] : the appearance of the neck was normal [Neck Cervical Mass (___cm)] : no neck mass was observed [Jugular Venous Distention Increased] : there was no jugular-venous distention [Thyroid Diffuse Enlargement] : the thyroid was not enlarged [Thyroid Nodule] : there were no palpable thyroid nodules [Auscultation Breath Sounds / Voice Sounds] : lungs were clear to auscultation bilaterally [Heart Rate And Rhythm] : heart rate was normal and rhythm regular [Heart Sounds] : normal S1 and S2 [Heart Sounds Gallop] : no gallops [Murmurs] : no murmurs [Heart Sounds Pericardial Friction Rub] : no pericardial rub [Bowel Sounds] : normal bowel sounds [Abdomen Soft] : soft [Abdomen Tenderness] : non-tender [] : no hepato-splenomegaly [Abdomen Mass (___ Cm)] : no abdominal mass palpated [Patient Refused] : rectal exam was refused by the patient [No CVA Tenderness] : no ~M costovertebral angle tenderness [Abnormal Walk] : normal gait [Skin Color & Pigmentation] : normal skin color and pigmentation [Skin Turgor] : normal skin turgor [Deep Tendon Reflexes (DTR)] : deep tendon reflexes were 2+ and symmetric [Sensation] : the sensory exam was normal to light touch and pinprick [Motor Exam] : the motor exam was normal [No Focal Deficits] : no focal deficits [Oriented To Time, Place, And Person] : oriented to person, place, and time [FreeTextEntry1] : no asterixis or evidence of encephalopathy

## 2019-02-04 ENCOUNTER — APPOINTMENT (OUTPATIENT)
Dept: PULMONOLOGY | Facility: CLINIC | Age: 69
End: 2019-02-04

## 2019-02-11 ENCOUNTER — APPOINTMENT (OUTPATIENT)
Dept: GASTROENTEROLOGY | Facility: HOSPITAL | Age: 69
End: 2019-02-11

## 2019-02-27 ENCOUNTER — APPOINTMENT (OUTPATIENT)
Dept: PULMONOLOGY | Facility: CLINIC | Age: 69
End: 2019-02-27
Payer: MEDICARE

## 2019-02-27 VITALS
DIASTOLIC BLOOD PRESSURE: 60 MMHG | BODY MASS INDEX: 26.13 KG/M2 | SYSTOLIC BLOOD PRESSURE: 115 MMHG | HEART RATE: 68 BPM | WEIGHT: 125 LBS | OXYGEN SATURATION: 95 %

## 2019-02-27 DIAGNOSIS — Z86.69 PERSONAL HISTORY OF OTHER DISEASES OF THE NERVOUS SYSTEM AND SENSE ORGANS: ICD-10-CM

## 2019-02-27 PROCEDURE — 99215 OFFICE O/P EST HI 40 MIN: CPT

## 2019-02-27 NOTE — REVIEW OF SYSTEMS
[As Noted in HPI] : as noted in HPI [Hepatic Disease] : hepatic disease [Easy Bruising] : a ~M tendency for easy bruising [Negative] : Sleep Disorder

## 2019-02-27 NOTE — REASON FOR VISIT
[Follow-Up - From Hospitalization] : a hospitalization follow-up [Abnormal CT Scan] : abnormal CT Scan [Asthma] : asthma [Shortness of Breath] : shortness of Breath [Family Member] : family member [FreeTextEntry2] : e.coli bacteremia, empyema post VATS,

## 2019-02-27 NOTE — PHYSICAL EXAM
[General Appearance - Well Developed] : well developed [Normal Appearance] : normal appearance [Well Groomed] : well groomed [General Appearance - Well Nourished] : well nourished [No Deformities] : no deformities [General Appearance - In No Acute Distress] : no acute distress [Normal Conjunctiva] : the conjunctiva exhibited no abnormalities [Eyelids - No Xanthelasma] : the eyelids demonstrated no xanthelasmas [Normal Oropharynx] : normal oropharynx [Neck Appearance] : the appearance of the neck was normal [Neck Cervical Mass (___cm)] : no neck mass was observed [Jugular Venous Distention Increased] : there was no jugular-venous distention [Thyroid Diffuse Enlargement] : the thyroid was not enlarged [Thyroid Nodule] : there were no palpable thyroid nodules [Heart Rate And Rhythm] : heart rate and rhythm were normal [Heart Sounds] : normal S1 and S2 [Murmurs] : no murmurs present [Respiration, Rhythm And Depth] : normal respiratory rhythm and effort [Exaggerated Use Of Accessory Muscles For Inspiration] : no accessory muscle use [Auscultation Breath Sounds / Voice Sounds] : lungs were clear to auscultation bilaterally [FreeTextEntry1] : Decreased at right base [Abdomen Soft] : soft [Abnormal Walk] : normal gait [Nail Clubbing] : no clubbing of the fingernails [Cyanosis, Localized] : no localized cyanosis [] : no ischemic changes [Skin Turgor] : normal skin turgor [No Focal Deficits] : no focal deficits [Oriented To Time, Place, And Person] : oriented to person, place, and time [Impaired Insight] : insight and judgment were intact [Affect] : the affect was normal

## 2019-03-04 ENCOUNTER — TRANSCRIPTION ENCOUNTER (OUTPATIENT)
Age: 69
End: 2019-03-04

## 2019-03-18 ENCOUNTER — RX RENEWAL (OUTPATIENT)
Age: 69
End: 2019-03-18

## 2019-03-28 ENCOUNTER — APPOINTMENT (OUTPATIENT)
Dept: PULMONOLOGY | Facility: CLINIC | Age: 69
End: 2019-03-28
Payer: MEDICARE

## 2019-03-28 VITALS
SYSTOLIC BLOOD PRESSURE: 104 MMHG | OXYGEN SATURATION: 98 % | RESPIRATION RATE: 16 BRPM | DIASTOLIC BLOOD PRESSURE: 68 MMHG | HEART RATE: 68 BPM

## 2019-03-28 PROCEDURE — 94010 BREATHING CAPACITY TEST: CPT

## 2019-03-28 PROCEDURE — 99214 OFFICE O/P EST MOD 30 MIN: CPT | Mod: 25

## 2019-03-28 NOTE — PHYSICAL EXAM
[General Appearance - Well Developed] : well developed [Normal Appearance] : normal appearance [Well Groomed] : well groomed [General Appearance - Well Nourished] : well nourished [No Deformities] : no deformities [General Appearance - In No Acute Distress] : no acute distress [Normal Conjunctiva] : the conjunctiva exhibited no abnormalities [Eyelids - No Xanthelasma] : the eyelids demonstrated no xanthelasmas [Normal Oropharynx] : normal oropharynx [Neck Appearance] : the appearance of the neck was normal [Neck Cervical Mass (___cm)] : no neck mass was observed [Jugular Venous Distention Increased] : there was no jugular-venous distention [Thyroid Diffuse Enlargement] : the thyroid was not enlarged [Thyroid Nodule] : there were no palpable thyroid nodules [Heart Rate And Rhythm] : heart rate and rhythm were normal [Heart Sounds] : normal S1 and S2 [Murmurs] : no murmurs present [Respiration, Rhythm And Depth] : normal respiratory rhythm and effort [Exaggerated Use Of Accessory Muscles For Inspiration] : no accessory muscle use [Auscultation Breath Sounds / Voice Sounds] : lungs were clear to auscultation bilaterally [Abdomen Soft] : soft [Abnormal Walk] : normal gait [Nail Clubbing] : no clubbing of the fingernails [Cyanosis, Localized] : no localized cyanosis [] : no ischemic changes [Skin Turgor] : normal skin turgor [No Focal Deficits] : no focal deficits [Oriented To Time, Place, And Person] : oriented to person, place, and time [Impaired Insight] : insight and judgment were intact [Affect] : the affect was normal

## 2019-03-28 NOTE — REASON FOR VISIT
[Follow-Up] : a follow-up visit [Abnormal CT Scan] : abnormal CT Scan [Asthma] : asthma [Family Member] : family member [FreeTextEntry2] : pleural effusion

## 2019-04-08 ENCOUNTER — APPOINTMENT (OUTPATIENT)
Dept: PULMONOLOGY | Facility: CLINIC | Age: 69
End: 2019-04-08

## 2019-06-11 ENCOUNTER — APPOINTMENT (OUTPATIENT)
Dept: OBGYN | Facility: CLINIC | Age: 69
End: 2019-06-11

## 2019-06-21 ENCOUNTER — APPOINTMENT (OUTPATIENT)
Dept: PULMONOLOGY | Facility: CLINIC | Age: 69
End: 2019-06-21
Payer: MEDICARE

## 2019-06-21 VITALS
DIASTOLIC BLOOD PRESSURE: 60 MMHG | OXYGEN SATURATION: 97 % | SYSTOLIC BLOOD PRESSURE: 98 MMHG | RESPIRATION RATE: 14 BRPM | HEART RATE: 81 BPM

## 2019-06-21 VITALS — WEIGHT: 144 LBS | BODY MASS INDEX: 30.1 KG/M2

## 2019-06-21 PROCEDURE — 99214 OFFICE O/P EST MOD 30 MIN: CPT | Mod: 25

## 2019-06-21 PROCEDURE — 94010 BREATHING CAPACITY TEST: CPT

## 2019-06-21 RX ORDER — ACETAMINOPHEN AND CODEINE PHOSPHATE 120; 12 MG/5ML; MG/5ML
120-12 SOLUTION ORAL
Qty: 118 | Refills: 0 | Status: DISCONTINUED | COMMUNITY
Start: 2018-03-22 | End: 2019-06-21

## 2019-06-21 RX ORDER — POLYETHYLENE GLYOCOL 3350, SODIUM CHLORIDE, SODIUM BICARBONATE AND POTASSIUM CHLORIDE 420; 11.2; 5.72; 1.48 G/4L; G/4L; G/4L; G/4L
420 POWDER, FOR SOLUTION NASOGASTRIC; ORAL
Qty: 1 | Refills: 0 | Status: DISCONTINUED | COMMUNITY
Start: 2019-01-08 | End: 2019-06-21

## 2019-06-21 NOTE — REVIEW OF SYSTEMS
[Hepatic Disease] : hepatic disease [Clotting Disorder] : clotting disorder [Negative] : Sleep Disorder

## 2019-06-21 NOTE — PHYSICAL EXAM
[General Appearance - Well Developed] : well developed [Normal Appearance] : normal appearance [Well Groomed] : well groomed [No Deformities] : no deformities [General Appearance - Well Nourished] : well nourished [General Appearance - In No Acute Distress] : no acute distress [Normal Conjunctiva] : the conjunctiva exhibited no abnormalities [Eyelids - No Xanthelasma] : the eyelids demonstrated no xanthelasmas [Normal Oropharynx] : normal oropharynx [Neck Appearance] : the appearance of the neck was normal [Neck Cervical Mass (___cm)] : no neck mass was observed [Jugular Venous Distention Increased] : there was no jugular-venous distention [Thyroid Diffuse Enlargement] : the thyroid was not enlarged [Thyroid Nodule] : there were no palpable thyroid nodules [Heart Rate And Rhythm] : heart rate and rhythm were normal [Heart Sounds] : normal S1 and S2 [Murmurs] : no murmurs present [Respiration, Rhythm And Depth] : normal respiratory rhythm and effort [Exaggerated Use Of Accessory Muscles For Inspiration] : no accessory muscle use [Auscultation Breath Sounds / Voice Sounds] : lungs were clear to auscultation bilaterally [Abdomen Soft] : soft [Abdomen Tenderness] : non-tender [Abdomen Mass (___ Cm)] : no abdominal mass palpated [Abnormal Walk] : normal gait [Gait - Sufficient For Exercise Testing] : the gait was sufficient for exercise testing [Nail Clubbing] : no clubbing of the fingernails [Cyanosis, Localized] : no localized cyanosis [Petechial Hemorrhages (___cm)] : no petechial hemorrhages [] : no ischemic changes [Skin Turgor] : normal skin turgor [Oriented To Time, Place, And Person] : oriented to person, place, and time [No Focal Deficits] : no focal deficits [Impaired Insight] : insight and judgment were intact [Affect] : the affect was normal

## 2019-07-11 ENCOUNTER — APPOINTMENT (OUTPATIENT)
Dept: OBGYN | Facility: CLINIC | Age: 69
End: 2019-07-11
Payer: MEDICARE

## 2019-07-11 VITALS
SYSTOLIC BLOOD PRESSURE: 120 MMHG | BODY MASS INDEX: 30.02 KG/M2 | HEIGHT: 58 IN | DIASTOLIC BLOOD PRESSURE: 74 MMHG | WEIGHT: 143 LBS

## 2019-07-11 LAB
BILIRUB UR QL STRIP: NORMAL
DATE COLLECTED: NORMAL
GLUCOSE UR-MCNC: NORMAL
HCG UR QL: 4 EU/DL
HEMOCCULT SP1 STL QL: NEGATIVE
HGB UR QL STRIP.AUTO: NORMAL
KETONES UR-MCNC: NORMAL
LEUKOCYTE ESTERASE UR QL STRIP: NORMAL
NITRITE UR QL STRIP: NORMAL
PH UR STRIP: 7
PROT UR STRIP-MCNC: ABNORMAL
QUALITY CONTROL: YES
SP GR UR STRIP: 1.02

## 2019-07-11 PROCEDURE — 81003 URINALYSIS AUTO W/O SCOPE: CPT | Mod: QW

## 2019-07-11 PROCEDURE — 99203 OFFICE O/P NEW LOW 30 MIN: CPT | Mod: 25

## 2019-07-11 PROCEDURE — 82270 OCCULT BLOOD FECES: CPT

## 2019-07-11 PROCEDURE — G0101: CPT

## 2019-07-11 NOTE — REVIEW OF SYSTEMS
[Nl] : Hematologic/Lymphatic [Fever] : no fever [Chills] : no chills [Chest Pain] : no chest pain [Dyspnea] : no shortness of breath [Abdominal Pain] : no abdominal pain [Vomiting] : no vomiting [Pelvic Pain] : no pelvic pain [Abn Vag Bleeding] : no abnormal vaginal bleeding

## 2019-07-11 NOTE — DISCUSSION/SUMMARY
[FreeTextEntry1] : F/U pap.\par \par We discussed Guiac positive stool. However she states she had a normal colonoscopy 3 months ago. She was instructed to discuss with her GI doctor as well. Task sent to her GI doctor.

## 2019-07-11 NOTE — HISTORY OF PRESENT ILLNESS
[unknown] : the patient is unsure of the date of her LMP [NA] : N/A [Post-Menopause, No Sxs] : post-menopausal, currently without symptoms [Menarche Age: ____] : age at menarche was [unfilled] [Hot Flashes] : no hot flashes [Night Sweats] : no night sweats [Vaginal Itching] : no vaginal itching [Dyspareunia] : no dyspareunia [Mood Changes] : no mood changes [Sexually Active] : is not sexually active

## 2019-07-11 NOTE — PHYSICAL EXAM
[Awake] : awake [Alert] : alert [Soft] : soft [Oriented x3] : oriented to person, place, and time [Labia Minora] : labia minora [Normal] : uterus [Labia Majora] : labia major [Atrophy] : atrophy [Uterine Adnexae] : were not tender and not enlarged [No Tenderness] : no rectal tenderness [Occult Blood] : occult blood test from digital rectal exam was positive [Acute Distress] : no acute distress [Mass] : no breast mass [Nipple Discharge] : no nipple discharge [Axillary LAD] : no axillary lymphadenopathy [Tender] : non tender [Distended] : not distended [Depressed Mood] : not depressed [Flat Affect] : affect not flat [Tenderness] : nontender

## 2019-07-13 LAB — HPV HIGH+LOW RISK DNA PNL CVX: NOT DETECTED

## 2019-07-26 LAB — CYTOLOGY CVX/VAG DOC THIN PREP: ABNORMAL

## 2019-08-13 ENCOUNTER — RX RENEWAL (OUTPATIENT)
Age: 69
End: 2019-08-13

## 2019-08-30 ENCOUNTER — RX RENEWAL (OUTPATIENT)
Age: 69
End: 2019-08-30

## 2019-09-09 ENCOUNTER — RX RENEWAL (OUTPATIENT)
Age: 69
End: 2019-09-09

## 2019-09-09 ENCOUNTER — RX CHANGE (OUTPATIENT)
Age: 69
End: 2019-09-09

## 2019-12-19 ENCOUNTER — APPOINTMENT (OUTPATIENT)
Dept: PULMONOLOGY | Facility: CLINIC | Age: 69
End: 2019-12-19
Payer: MEDICARE

## 2019-12-19 VITALS
HEIGHT: 58 IN | OXYGEN SATURATION: 99 % | HEART RATE: 80 BPM | BODY MASS INDEX: 30.02 KG/M2 | DIASTOLIC BLOOD PRESSURE: 60 MMHG | SYSTOLIC BLOOD PRESSURE: 100 MMHG | WEIGHT: 143 LBS

## 2019-12-19 VITALS — BODY MASS INDEX: 30.64 KG/M2 | WEIGHT: 146 LBS | HEIGHT: 58 IN

## 2019-12-19 DIAGNOSIS — R09.02 HYPOXEMIA: ICD-10-CM

## 2019-12-19 DIAGNOSIS — Z99.81 HYPOXEMIA: ICD-10-CM

## 2019-12-19 PROCEDURE — 94010 BREATHING CAPACITY TEST: CPT

## 2019-12-19 PROCEDURE — 99214 OFFICE O/P EST MOD 30 MIN: CPT | Mod: 25

## 2019-12-19 RX ORDER — PANTOPRAZOLE 20 MG/1
20 TABLET, DELAYED RELEASE ORAL DAILY
Qty: 30 | Refills: 5 | Status: DISCONTINUED | COMMUNITY
Start: 2017-12-08 | End: 2019-12-19

## 2019-12-19 NOTE — PHYSICAL EXAM
[General Appearance - Well Developed] : well developed [Well Groomed] : well groomed [Normal Appearance] : normal appearance [General Appearance - Well Nourished] : well nourished [No Deformities] : no deformities [General Appearance - In No Acute Distress] : no acute distress [Normal Conjunctiva] : the conjunctiva exhibited no abnormalities [Eyelids - No Xanthelasma] : the eyelids demonstrated no xanthelasmas [Normal Oropharynx] : normal oropharynx [Neck Cervical Mass (___cm)] : no neck mass was observed [Neck Appearance] : the appearance of the neck was normal [Jugular Venous Distention Increased] : there was no jugular-venous distention [Thyroid Diffuse Enlargement] : the thyroid was not enlarged [Heart Sounds] : normal S1 and S2 [Heart Rate And Rhythm] : heart rate and rhythm were normal [Thyroid Nodule] : there were no palpable thyroid nodules [Murmurs] : no murmurs present [Exaggerated Use Of Accessory Muscles For Inspiration] : no accessory muscle use [Respiration, Rhythm And Depth] : normal respiratory rhythm and effort [Auscultation Breath Sounds / Voice Sounds] : lungs were clear to auscultation bilaterally [Abdomen Soft] : soft [Abdomen Tenderness] : non-tender [Abdomen Mass (___ Cm)] : no abdominal mass palpated [Abnormal Walk] : normal gait [Gait - Sufficient For Exercise Testing] : the gait was sufficient for exercise testing [Nail Clubbing] : no clubbing of the fingernails [Cyanosis, Localized] : no localized cyanosis [] : no ischemic changes [Petechial Hemorrhages (___cm)] : no petechial hemorrhages [Skin Turgor] : normal skin turgor [No Focal Deficits] : no focal deficits [Impaired Insight] : insight and judgment were intact [Oriented To Time, Place, And Person] : oriented to person, place, and time [Affect] : the affect was normal

## 2019-12-19 NOTE — REASON FOR VISIT
[Abnormal CT Scan] : abnormal CT Scan [Follow-Up] : a follow-up visit [Family Member] : family member [Asthma] : asthma [FreeTextEntry2] : h/o right pleural effusion

## 2020-01-20 ENCOUNTER — RX RENEWAL (OUTPATIENT)
Age: 70
End: 2020-01-20

## 2020-03-18 ENCOUNTER — APPOINTMENT (OUTPATIENT)
Dept: PULMONOLOGY | Facility: CLINIC | Age: 70
End: 2020-03-18
Payer: MEDICARE

## 2020-03-18 VITALS
BODY MASS INDEX: 31.49 KG/M2 | HEART RATE: 72 BPM | DIASTOLIC BLOOD PRESSURE: 56 MMHG | SYSTOLIC BLOOD PRESSURE: 108 MMHG | WEIGHT: 150 LBS | HEIGHT: 58 IN | OXYGEN SATURATION: 99 %

## 2020-03-18 PROCEDURE — 99214 OFFICE O/P EST MOD 30 MIN: CPT

## 2020-03-18 RX ORDER — POTASSIUM CHLORIDE 1500 MG/1
20 TABLET, FILM COATED, EXTENDED RELEASE ORAL
Qty: 30 | Refills: 5 | Status: ACTIVE | COMMUNITY
Start: 2018-11-13

## 2020-03-18 NOTE — PHYSICAL EXAM
[No Acute Distress] : no acute distress [Normal Oropharynx] : normal oropharynx [Normal Appearance] : normal appearance [No Neck Mass] : no neck mass [Normal Rate/Rhythm] : normal rate/rhythm [Normal S1, S2] : normal s1, s2 [No Murmurs] : no murmurs [No Resp Distress] : no resp distress [No Acc Muscle Use] : no acc muscle use [Normal Rhythm and Effort] : normal rhythm and effort [Clear to Auscultation Bilaterally] : clear to auscultation bilaterally [No Abnormalities] : no abnormalities [Benign] : benign [Normal Gait] : normal gait [No Clubbing] : no clubbing [No Cyanosis] : no cyanosis [No Edema] : no edema [FROM] : FROM [Normal Color/ Pigmentation] : normal color/ pigmentation [No Focal Deficits] : no focal deficits [Oriented x3] : oriented x3 [Normal Affect] : normal affect [TextBox_68] : Decreased minimally at right base

## 2020-06-16 ENCOUNTER — APPOINTMENT (OUTPATIENT)
Dept: GASTROENTEROLOGY | Facility: CLINIC | Age: 70
End: 2020-06-16
Payer: MEDICARE

## 2020-06-16 VITALS
HEART RATE: 57 BPM | HEIGHT: 58 IN | WEIGHT: 154 LBS | BODY MASS INDEX: 32.32 KG/M2 | TEMPERATURE: 98.1 F | SYSTOLIC BLOOD PRESSURE: 98 MMHG | DIASTOLIC BLOOD PRESSURE: 51 MMHG

## 2020-06-16 VITALS — TEMPERATURE: 98.1 F

## 2020-06-16 PROCEDURE — 99214 OFFICE O/P EST MOD 30 MIN: CPT

## 2020-06-16 NOTE — PHYSICAL EXAM
[General Appearance - In No Acute Distress] : in no acute distress [General Appearance - Alert] : alert [General Appearance - Well Developed] : well developed [General Appearance - Well-Appearing] : healthy appearing [General Appearance - Well Nourished] : well nourished [Extraocular Movements] : extraocular movements were intact [PERRL With Normal Accommodation] : pupils were equal in size, round, and reactive to light [Sclera] : the sclera and conjunctiva were normal [Outer Ear] : the ears and nose were normal in appearance [Examination Of The Oral Cavity] : the lips and gums were normal [Oropharynx] : the oropharynx was normal [Neck Cervical Mass (___cm)] : no neck mass was observed [Neck Appearance] : the appearance of the neck was normal [Thyroid Nodule] : there were no palpable thyroid nodules [Jugular Venous Distention Increased] : there was no jugular-venous distention [Thyroid Diffuse Enlargement] : the thyroid was not enlarged [Auscultation Breath Sounds / Voice Sounds] : lungs were clear to auscultation bilaterally [Heart Rate And Rhythm] : heart rate was normal and rhythm regular [Heart Sounds Gallop] : no gallops [Heart Sounds] : normal S1 and S2 [Bowel Sounds] : normal bowel sounds [Heart Sounds Pericardial Friction Rub] : no pericardial rub [Murmurs] : no murmurs [Abdomen Soft] : soft [Abdomen Mass (___ Cm)] : no abdominal mass palpated [] : no hepato-splenomegaly [Abdomen Tenderness] : non-tender [No CVA Tenderness] : no ~M costovertebral angle tenderness [Patient Refused] : rectal exam was refused by the patient [Abnormal Walk] : normal gait [Skin Color & Pigmentation] : normal skin color and pigmentation [Deep Tendon Reflexes (DTR)] : deep tendon reflexes were 2+ and symmetric [Skin Turgor] : normal skin turgor [Sensation] : the sensory exam was normal to light touch and pinprick [Motor Exam] : the motor exam was normal [No Focal Deficits] : no focal deficits [Oriented To Time, Place, And Person] : oriented to person, place, and time [FreeTextEntry1] : nonreducible nontender non-incarcerated umbilical hernia which is small. No shifting dullnessor clinically detectable ascites

## 2020-06-16 NOTE — HISTORY OF PRESENT ILLNESS
[None] : had no significant interval events [Heartburn] : denies heartburn [Constipation] : denies constipation [Nausea] : denies nausea [Vomiting] : denies vomiting [Diarrhea] : denies diarrhea [Abdominal Pain] : denies abdominal pain [Abdominal Swelling] : denies abdominal swelling [Yellow Skin Or Eyes (Jaundice)] : denies jaundice [Rectal Pain] : denies rectal pain [Wt Gain ___ Lbs] : no recent weight gain [Wt Loss ___ Lbs] : no recent weight loss [GERD] : no gastroesophageal reflux disease [Peptic Ulcer Disease] : no peptic ulcer disease [Hiatus Hernia] : no hiatus hernia [Pancreatitis] : no pancreatitis [Kidney Stone] : no kidney stone [Inflammatory Bowel Disease] : no inflammatory bowel disease [Irritable Bowel Syndrome] : no irritable bowel syndrome [Alcohol Abuse] : no alcohol abuse [Diverticulitis] : no diverticulitis [Malignancy] : no malignancy [Abdominal Surgery] : no abdominal surgery [Appendectomy] : no appendectomy [de-identified] : Patient presents today for followup evaluation cryptogenic cirrhosis which is well compensated which had previously been present with ascites but no longer is. She is followed by the hepatology division at [facility] in [place] and recently had a CT of the abdomen and pelvis there last month which showed stable cirrhosis with no evidence of hepatoma or ascites. She has no complaints of abdominal pain or distention or leg swelling or jaundice or nausea or vomiting or hematemesis or melena or hematochezia. Her cirrhosis is likely secondary to nonalcoholic fatty liver disease from previous obesity. She has no history of alcohol abuse and liver serologies were all negative for other forms of chronic liver disease or hepatitis. [de-identified] : January 2019 [Cholecystectomy] : no cholecystectomy

## 2020-06-16 NOTE — ASSESSMENT
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 She had a recent CT scan which showed stable cirrhosis without hepatoma done at [facility] in [place].\par \par Recommendations: Patient was sent for repeat lab work including CMP, CBC with differential, AFP and PT/INR and was told to continue her current medical therapy. She was told to return in 3 months for follow up evaluation or sooner if necessary and both her and her son who accompanied the patient today appeared to understand all of the above instructions, information, and management plan.

## 2020-06-25 ENCOUNTER — APPOINTMENT (OUTPATIENT)
Dept: PULMONOLOGY | Facility: CLINIC | Age: 70
End: 2020-06-25
Payer: MEDICARE

## 2020-06-25 VITALS
HEART RATE: 71 BPM | OXYGEN SATURATION: 97 % | SYSTOLIC BLOOD PRESSURE: 110 MMHG | WEIGHT: 149 LBS | DIASTOLIC BLOOD PRESSURE: 62 MMHG | BODY MASS INDEX: 31.14 KG/M2

## 2020-06-25 PROCEDURE — 99213 OFFICE O/P EST LOW 20 MIN: CPT

## 2020-06-25 RX ORDER — ALBUTEROL SULFATE 90 UG/1
108 (90 BASE) AEROSOL, METERED RESPIRATORY (INHALATION)
Qty: 3 | Refills: 3 | Status: DISCONTINUED | COMMUNITY
End: 2020-06-25

## 2020-06-25 NOTE — PHYSICAL EXAM
[No Acute Distress] : no acute distress [Normal Oropharynx] : normal oropharynx [No Neck Mass] : no neck mass [Normal Appearance] : normal appearance [Normal Rate/Rhythm] : normal rate/rhythm [Normal S1, S2] : normal s1, s2 [No Resp Distress] : no resp distress [No Murmurs] : no murmurs [No Acc Muscle Use] : no acc muscle use [Normal Rhythm and Effort] : normal rhythm and effort [Clear to Auscultation Bilaterally] : clear to auscultation bilaterally [No Abnormalities] : no abnormalities [Normal Gait] : normal gait [Benign] : benign [No Clubbing] : no clubbing [No Cyanosis] : no cyanosis [No Edema] : no edema [FROM] : FROM [No Focal Deficits] : no focal deficits [Normal Color/ Pigmentation] : normal color/ pigmentation [Normal Affect] : normal affect [Oriented x3] : oriented x3 [TextBox_68] : Decreased minimally at right base

## 2020-09-04 ENCOUNTER — LABORATORY RESULT (OUTPATIENT)
Age: 70
End: 2020-09-04

## 2020-09-04 ENCOUNTER — APPOINTMENT (OUTPATIENT)
Dept: OBGYN | Facility: CLINIC | Age: 70
End: 2020-09-04
Payer: MEDICARE

## 2020-09-04 VITALS
SYSTOLIC BLOOD PRESSURE: 100 MMHG | WEIGHT: 154 LBS | DIASTOLIC BLOOD PRESSURE: 62 MMHG | HEIGHT: 58 IN | TEMPERATURE: 97.7 F | BODY MASS INDEX: 32.32 KG/M2

## 2020-09-04 DIAGNOSIS — N95.2 POSTMENOPAUSAL ATROPHIC VAGINITIS: ICD-10-CM

## 2020-09-04 DIAGNOSIS — Z78.9 OTHER SPECIFIED HEALTH STATUS: ICD-10-CM

## 2020-09-04 DIAGNOSIS — R05 COUGH: ICD-10-CM

## 2020-09-04 DIAGNOSIS — K76.0 FATTY (CHANGE OF) LIVER, NOT ELSEWHERE CLASSIFIED: ICD-10-CM

## 2020-09-04 DIAGNOSIS — Z87.19 PERSONAL HISTORY OF OTHER DISEASES OF THE DIGESTIVE SYSTEM: ICD-10-CM

## 2020-09-04 DIAGNOSIS — Z01.419 ENCOUNTER FOR GYNECOLOGICAL EXAMINATION (GENERAL) (ROUTINE) W/OUT ABNORMAL FINDINGS: ICD-10-CM

## 2020-09-04 DIAGNOSIS — Z87.898 PERSONAL HISTORY OF OTHER SPECIFIED CONDITIONS: ICD-10-CM

## 2020-09-04 DIAGNOSIS — J90 PLEURAL EFFUSION, NOT ELSEWHERE CLASSIFIED: ICD-10-CM

## 2020-09-04 DIAGNOSIS — Z86.018 PERSONAL HISTORY OF OTHER BENIGN NEOPLASM: ICD-10-CM

## 2020-09-04 DIAGNOSIS — M17.10 UNILATERAL PRIMARY OSTEOARTHRITIS, UNSPECIFIED KNEE: ICD-10-CM

## 2020-09-04 DIAGNOSIS — D50.0 IRON DEFICIENCY ANEMIA SECONDARY TO BLOOD LOSS (CHRONIC): ICD-10-CM

## 2020-09-04 DIAGNOSIS — Z12.31 ENCOUNTER FOR SCREENING MAMMOGRAM FOR MALIGNANT NEOPLASM OF BREAST: ICD-10-CM

## 2020-09-04 DIAGNOSIS — M25.569 PAIN IN UNSPECIFIED KNEE: ICD-10-CM

## 2020-09-04 DIAGNOSIS — J86.9 PYOTHORAX W/OUT FISTULA: ICD-10-CM

## 2020-09-04 DIAGNOSIS — R10.11 RIGHT UPPER QUADRANT PAIN: ICD-10-CM

## 2020-09-04 DIAGNOSIS — Z86.39 PERSONAL HISTORY OF OTHER ENDOCRINE, NUTRITIONAL AND METABOLIC DISEASE: ICD-10-CM

## 2020-09-04 DIAGNOSIS — R19.5 OTHER FECAL ABNORMALITIES: ICD-10-CM

## 2020-09-04 DIAGNOSIS — Z92.89 PERSONAL HISTORY OF OTHER MEDICAL TREATMENT: ICD-10-CM

## 2020-09-04 DIAGNOSIS — K64.9 UNSPECIFIED HEMORRHOIDS: ICD-10-CM

## 2020-09-04 DIAGNOSIS — Z01.411 ENCOUNTER FOR GYNECOLOGICAL EXAMINATION (GENERAL) (ROUTINE) WITH ABNORMAL FINDINGS: ICD-10-CM

## 2020-09-04 DIAGNOSIS — Z87.09 PERSONAL HISTORY OF OTHER DISEASES OF THE RESPIRATORY SYSTEM: ICD-10-CM

## 2020-09-04 DIAGNOSIS — Z09 ENCOUNTER FOR FOLLOW-UP EXAMINATION AFTER COMPLETED TREATMENT FOR CONDITIONS OTHER THAN MALIGNANT NEOPLASM: ICD-10-CM

## 2020-09-04 PROCEDURE — 82270 OCCULT BLOOD FECES: CPT

## 2020-09-04 PROCEDURE — G0101: CPT

## 2020-09-08 PROBLEM — N95.2 VAGINAL ATROPHY: Status: ACTIVE | Noted: 2019-07-11

## 2020-09-08 PROBLEM — Z78.9 EXERCISES DAILY: Status: ACTIVE | Noted: 2020-09-04

## 2020-09-08 NOTE — PHYSICAL EXAM
[Alert] : alert [Awake] : awake [Mass] : no breast mass [Acute Distress] : no acute distress [Nipple Discharge] : no nipple discharge [Axillary LAD] : no axillary lymphadenopathy [Soft] : soft [Tender] : non tender [Depressed Mood] : not depressed [Distended] : not distended [Oriented x3] : oriented to person, place, and time [Flat Affect] : affect not flat [Normal] : uterus [Labia Majora] : labia major [Labia Minora] : labia minora [Pap Obtained] : a Pap smear was performed [Atrophy] : atrophy [Tenderness] : nontender [Dry Mucosa] : dry mucosa [Adnexa Tenderness] : were not tender [No Tenderness] : no rectal tenderness [Occult Blood] : occult blood test from digital rectal exam was negative

## 2020-09-08 NOTE — HISTORY OF PRESENT ILLNESS
[___ Month(s) Ago] : [unfilled] month(s) ago [Healthy Diet] : a healthy diet [Good] : being in good health [Regular Exercise] : regular exercise [Last Colonoscopy ___] : Last colonoscopy [unfilled] [Postmenopausal] : is postmenopausal [Last Pap ___] : Last cervical pap smear was [unfilled] [Pregnancy History] : pregnancy history: [Definite:  ___ (Date)] : the last menstrual period was [unfilled] [Menarche Age: ____] : age at menarche was [unfilled] [Male ___] : [unfilled] male [No] : no [Dyspareunia] : dyspareunia [Sexually Active] : is not sexually active [Burning] : no burning [Itching] : no itching [Mass] : no mass [Stinging] : no stinging [Lesion] : no lesion [Discharge] : no discharge [Soreness] : no soreness [Localized Pain] : no localized pain [Mass (___cm)] : no palpable mass [Nipple Discharge] : no nipple discharge [Diffused Pain] : no diffused pain [Night Sweats] : no night sweats [Hot Flashes] : no hot flashes [Vaginal Itching] : no vaginal itching [Loss of Libido] : no loss of libido [Contraception] : does not use contraception

## 2020-09-08 NOTE — REVIEW OF SYSTEMS
[Nl] : Musculoskeletal [Fever] : no fever [Abn Vag Bleeding] : no abnormal vaginal bleeding [Pelvic Pain] : no pelvic pain [Chills] : no chills

## 2020-09-16 LAB
CYTOLOGY CVX/VAG DOC THIN PREP: ABNORMAL
HPV HIGH+LOW RISK DNA PNL CVX: DETECTED

## 2020-12-23 PROBLEM — Z12.31 ENCOUNTER FOR SCREENING MAMMOGRAM FOR BREAST CANCER: Status: RESOLVED | Noted: 2020-09-04 | Resolved: 2020-12-23

## 2021-02-23 ENCOUNTER — APPOINTMENT (OUTPATIENT)
Dept: PULMONOLOGY | Facility: CLINIC | Age: 71
End: 2021-02-23
Payer: MEDICARE

## 2021-02-23 VITALS
BODY MASS INDEX: 31.25 KG/M2 | SYSTOLIC BLOOD PRESSURE: 110 MMHG | RESPIRATION RATE: 14 BRPM | HEART RATE: 66 BPM | OXYGEN SATURATION: 98 % | DIASTOLIC BLOOD PRESSURE: 70 MMHG | WEIGHT: 155 LBS | HEIGHT: 59 IN | TEMPERATURE: 97.6 F

## 2021-02-23 DIAGNOSIS — I82.401 ACUTE EMBOLISM AND THROMBOSIS OF UNSPECIFIED DEEP VEINS OF RIGHT LOWER EXTREMITY: ICD-10-CM

## 2021-02-23 PROCEDURE — 99213 OFFICE O/P EST LOW 20 MIN: CPT

## 2021-02-23 NOTE — CONSULT LETTER
[Dear  ___] : Dear  [unfilled], [Courtesy Letter:] : I had the pleasure of seeing your patient, [unfilled], in my office today. [Please see my note below.] : Please see my note below. [Consult Closing:] : Thank you very much for allowing me to participate in the care of this patient.  If you have any questions, please do not hesitate to contact me. [Sincerely,] : Sincerely, [FreeTextEntry3] : Ismael Tamez MD\par

## 2021-03-10 ENCOUNTER — APPOINTMENT (OUTPATIENT)
Dept: CARDIOLOGY | Facility: CLINIC | Age: 71
End: 2021-03-10

## 2021-03-23 ENCOUNTER — APPOINTMENT (OUTPATIENT)
Dept: GASTROENTEROLOGY | Facility: CLINIC | Age: 71
End: 2021-03-23
Payer: MEDICARE

## 2021-03-23 VITALS
DIASTOLIC BLOOD PRESSURE: 56 MMHG | BODY MASS INDEX: 30.64 KG/M2 | RESPIRATION RATE: 12 BRPM | WEIGHT: 152 LBS | HEART RATE: 93 BPM | HEIGHT: 59 IN | TEMPERATURE: 97.7 F | SYSTOLIC BLOOD PRESSURE: 102 MMHG

## 2021-03-23 DIAGNOSIS — Z86.010 PERSONAL HISTORY OF COLONIC POLYPS: ICD-10-CM

## 2021-03-23 DIAGNOSIS — R19.4 CHANGE IN BOWEL HABIT: ICD-10-CM

## 2021-03-23 PROCEDURE — 99214 OFFICE O/P EST MOD 30 MIN: CPT

## 2021-03-23 NOTE — PHYSICAL EXAM
Placed pt of tele monitor:[id number]   PAST SURGICAL HISTORY:  No significant past surgical history      [General Appearance - Alert] : alert [General Appearance - In No Acute Distress] : in no acute distress [General Appearance - Well Nourished] : well nourished [General Appearance - Well Developed] : well developed [General Appearance - Well-Appearing] : healthy appearing [Sclera] : the sclera and conjunctiva were normal [PERRL With Normal Accommodation] : pupils were equal in size, round, and reactive to light [Extraocular Movements] : extraocular movements were intact [Outer Ear] : the ears and nose were normal in appearance [Examination Of The Oral Cavity] : the lips and gums were normal [Oropharynx] : the oropharynx was normal [Neck Appearance] : the appearance of the neck was normal [Neck Cervical Mass (___cm)] : no neck mass was observed [Jugular Venous Distention Increased] : there was no jugular-venous distention [Thyroid Diffuse Enlargement] : the thyroid was not enlarged [Thyroid Nodule] : there were no palpable thyroid nodules [Auscultation Breath Sounds / Voice Sounds] : lungs were clear to auscultation bilaterally [Heart Rate And Rhythm] : heart rate was normal and rhythm regular [Heart Sounds] : normal S1 and S2 [Heart Sounds Gallop] : no gallops [Murmurs] : no murmurs [Heart Sounds Pericardial Friction Rub] : no pericardial rub [Bowel Sounds] : normal bowel sounds [Abdomen Soft] : soft [Abdomen Tenderness] : non-tender [] : no hepato-splenomegaly [Abdomen Mass (___ Cm)] : no abdominal mass palpated [Patient Refused] : rectal exam was refused by the patient [No CVA Tenderness] : no ~M costovertebral angle tenderness [Abnormal Walk] : normal gait [Skin Color & Pigmentation] : normal skin color and pigmentation [Skin Turgor] : normal skin turgor [Deep Tendon Reflexes (DTR)] : deep tendon reflexes were 2+ and symmetric [Sensation] : the sensory exam was normal to light touch and pinprick [Motor Exam] : the motor exam was normal [No Focal Deficits] : no focal deficits [Oriented To Time, Place, And Person] : oriented to person, place, and time [FreeTextEntry1] : no asterixis or evidence of encephalopathy

## 2021-03-23 NOTE — HISTORY OF PRESENT ILLNESS
[None] : had no significant interval events [Heartburn] : denies heartburn [Nausea] : denies nausea [Vomiting] : denies vomiting [Diarrhea] : denies diarrhea [Constipation] : denies constipation [Yellow Skin Or Eyes (Jaundice)] : denies jaundice [Abdominal Pain] : denies abdominal pain [Abdominal Swelling] : denies abdominal swelling [Rectal Pain] : denies rectal pain [Wt Gain ___ Lbs] : no recent weight gain [Wt Loss ___ Lbs] : no recent weight loss [GERD] : no gastroesophageal reflux disease [Hiatus Hernia] : no hiatus hernia [Peptic Ulcer Disease] : no peptic ulcer disease [Pancreatitis] : no pancreatitis [Cholelithiasis] : no cholelithiasis [Kidney Stone] : no kidney stone [Inflammatory Bowel Disease] : no inflammatory bowel disease [Irritable Bowel Syndrome] : no irritable bowel syndrome [Diverticulitis] : no diverticulitis [Alcohol Abuse] : no alcohol abuse [Malignancy] : no malignancy [Abdominal Surgery] : no abdominal surgery [Appendectomy] : no appendectomy [Cholecystectomy] : no cholecystectomy [de-identified] : June 2020 [de-identified] : Renewing medications [de-identified] : Patient presents for follow-up evaluation of nonalcoholic cirrhosis without ascites status post an EGD as well as colonoscopy with polypectomy both done in 2017.  Her EGD did show esophageal and gastric varices and she has been on chronic nonselective beta-blockade to prevent variceal bleeding which is so far worked.  She also had a colonoscopy with polypectomy of an adenomatous colon polyp in 2017 and has had no subsequent lower GI testing or upper endoscopies.  She has no history of alcohol abuse and is not a candidate for liver transplant because of portal vein thrombosis and [facility] and was deemed a nons[facility] candidate for this reason.  Her cirrhosis however has been compensated without ascites or hepatic encephalopathy or GI bleeding.

## 2021-03-23 NOTE — ASSESSMENT
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  History of colon polyps status post colonoscopy with polypectomy in 2017 rule out recurrent colonic neoplasm.\par \par Recommendations: Patient was sent for repeat lab work including CMP and CBC with differential and AFP and PT/INR as well as a repeat abdominal sonogram to reimage her liver.  Upper endoscopy and high risk screening colonoscopy were recommended for further evaluation of the above.  The risk versus benefits of both upper endoscopy and colonoscopy and intravenous sedation, and alternative testing such as upper GI series and virtual colonoscopy, were individually explained to the patient today in Sinhala by myself who speaks Sinhala.  She appeared to understand all of the above and was agreeable to proceeding with both upper endoscopy and colonoscopy which will be done at [facility] because of her history of cirrhosis and previous esophageal and gastric varices.  Her ASA classification is 3 and she is medically optimized for the proposed upper endoscopy and colonoscopy and appeared to understand all of the above instructions, information, and management plan.

## 2021-03-24 ENCOUNTER — OUTPATIENT (OUTPATIENT)
Dept: OUTPATIENT SERVICES | Facility: HOSPITAL | Age: 71
LOS: 1 days | End: 2021-03-24
Payer: MEDICARE

## 2021-03-24 ENCOUNTER — APPOINTMENT (OUTPATIENT)
Dept: ULTRASOUND IMAGING | Facility: CLINIC | Age: 71
End: 2021-03-24
Payer: MEDICARE

## 2021-03-24 DIAGNOSIS — Z98.890 OTHER SPECIFIED POSTPROCEDURAL STATES: Chronic | ICD-10-CM

## 2021-03-24 DIAGNOSIS — K74.60 UNSPECIFIED CIRRHOSIS OF LIVER: ICD-10-CM

## 2021-03-24 LAB
AFP-TM SERPL-MCNC: 2.2 NG/ML
ALBUMIN SERPL ELPH-MCNC: 4.1 G/DL
ALP BLD-CCNC: 120 U/L
ALT SERPL-CCNC: 12 U/L
ANION GAP SERPL CALC-SCNC: 10 MMOL/L
AST SERPL-CCNC: 20 U/L
BASOPHILS # BLD AUTO: 0.02 K/UL
BASOPHILS NFR BLD AUTO: 0.7 %
BILIRUB SERPL-MCNC: 0.8 MG/DL
BUN SERPL-MCNC: 15 MG/DL
CALCIUM SERPL-MCNC: 9.2 MG/DL
CHLORIDE SERPL-SCNC: 103 MMOL/L
CO2 SERPL-SCNC: 25 MMOL/L
CREAT SERPL-MCNC: 0.7 MG/DL
EOSINOPHIL # BLD AUTO: 0.09 K/UL
EOSINOPHIL NFR BLD AUTO: 3.1 %
GLUCOSE SERPL-MCNC: 93 MG/DL
HCT VFR BLD CALC: 33.5 %
HGB BLD-MCNC: 10.1 G/DL
IMM GRANULOCYTES NFR BLD AUTO: 0 %
INR PPP: 2.46 RATIO
LYMPHOCYTES # BLD AUTO: 0.52 K/UL
LYMPHOCYTES NFR BLD AUTO: 18 %
MAN DIFF?: NORMAL
MCHC RBC-ENTMCNC: 26.1 PG
MCHC RBC-ENTMCNC: 30.1 GM/DL
MCV RBC AUTO: 86.6 FL
MONOCYTES # BLD AUTO: 0.32 K/UL
MONOCYTES NFR BLD AUTO: 11.1 %
NEUTROPHILS # BLD AUTO: 1.94 K/UL
NEUTROPHILS NFR BLD AUTO: 67.1 %
PLATELET # BLD AUTO: 165 K/UL
POTASSIUM SERPL-SCNC: 4.3 MMOL/L
PROT SERPL-MCNC: 7.5 G/DL
PT BLD: 28 SEC
RBC # BLD: 3.87 M/UL
RBC # FLD: 18.4 %
SODIUM SERPL-SCNC: 139 MMOL/L
WBC # FLD AUTO: 2.89 K/UL

## 2021-03-24 PROCEDURE — 76700 US EXAM ABDOM COMPLETE: CPT

## 2021-03-24 PROCEDURE — 76700 US EXAM ABDOM COMPLETE: CPT | Mod: 26

## 2021-04-20 ENCOUNTER — APPOINTMENT (OUTPATIENT)
Dept: DISASTER EMERGENCY | Facility: CLINIC | Age: 71
End: 2021-04-20

## 2021-04-21 LAB — SARS-COV-2 N GENE NPH QL NAA+PROBE: NOT DETECTED

## 2021-04-23 ENCOUNTER — OUTPATIENT (OUTPATIENT)
Dept: OUTPATIENT SERVICES | Facility: HOSPITAL | Age: 71
LOS: 1 days | End: 2021-04-23
Payer: COMMERCIAL

## 2021-04-23 ENCOUNTER — RESULT REVIEW (OUTPATIENT)
Age: 71
End: 2021-04-23

## 2021-04-23 ENCOUNTER — APPOINTMENT (OUTPATIENT)
Dept: GASTROENTEROLOGY | Facility: HOSPITAL | Age: 71
End: 2021-04-23

## 2021-04-23 DIAGNOSIS — K31.7 POLYP OF STOMACH AND DUODENUM: ICD-10-CM

## 2021-04-23 DIAGNOSIS — Z98.890 OTHER SPECIFIED POSTPROCEDURAL STATES: Chronic | ICD-10-CM

## 2021-04-23 DIAGNOSIS — Z86.010 PERSONAL HISTORY OF COLONIC POLYPS: ICD-10-CM

## 2021-04-23 DIAGNOSIS — K57.30 DIVERTICULOSIS OF LARGE INTESTINE W/OUT PERFORATION OR ABSCESS W/OUT BLEEDING: ICD-10-CM

## 2021-04-23 DIAGNOSIS — K74.60 UNSPECIFIED CIRRHOSIS OF LIVER: ICD-10-CM

## 2021-04-23 DIAGNOSIS — K55.21 ANGIODYSPLASIA OF COLON WITH HEMORRHAGE: ICD-10-CM

## 2021-04-23 DIAGNOSIS — K64.9 UNSPECIFIED HEMORRHOIDS: ICD-10-CM

## 2021-04-23 DIAGNOSIS — K62.1 RECTAL POLYP: ICD-10-CM

## 2021-04-23 DIAGNOSIS — I85.00 ESOPHAGEAL VARICES WITHOUT BLEEDING: ICD-10-CM

## 2021-04-23 DIAGNOSIS — K63.5 POLYP OF COLON: ICD-10-CM

## 2021-04-23 PROCEDURE — 45385 COLONOSCOPY W/LESION REMOVAL: CPT | Mod: 59

## 2021-04-23 PROCEDURE — 88305 TISSUE EXAM BY PATHOLOGIST: CPT | Mod: 26

## 2021-04-23 PROCEDURE — 45388 COLONOSCOPY W/ABLATION: CPT | Mod: 59

## 2021-04-23 PROCEDURE — 88305 TISSUE EXAM BY PATHOLOGIST: CPT

## 2021-04-23 PROCEDURE — 88342 IMHCHEM/IMCYTCHM 1ST ANTB: CPT | Mod: 26

## 2021-04-23 PROCEDURE — 43239 EGD BIOPSY SINGLE/MULTIPLE: CPT

## 2021-04-23 PROCEDURE — 45385 COLONOSCOPY W/LESION REMOVAL: CPT | Mod: PT

## 2021-04-23 PROCEDURE — 88342 IMHCHEM/IMCYTCHM 1ST ANTB: CPT

## 2021-04-23 NOTE — PROCEDURE
[Hx of Colon Polyps] : history of colon polyps [Bleeding] : bleeding risk [Infection] : risk of infection [Bowel Prep Kit] : the patient took the appropriate bowel preparation kit as directed [Approved Diet Followed] : the patient avoided solid foods and adhered to the approved diet list for 24 hours prior to the procedure [Procedure Explained] : The procedure was explained [Allergies Reviewed] : allergies reviewed. [Risks] : Risks [Benefits] : benefits [Alternatives] : alternatives [Consent Obtained] : written consent was obtained prior to the procedure and is detailed in the patient's record [Patient] : the patient [Automated Blood Pressure Cuff] : automated blood pressure cuff [Cardiac Monitor] : cardiac monitor [Pulse Oximeter] : pulse oximeter [___ L/min Oxygen via NC] : [unfilled] ~Uliters/minute oxygen via nasal cannula [3] : 3 [Sedation Clearance] : the patient was cleared for moderate sedation [Performed By: ___] : Performed by:  ALLEN [With Snare Polypectomy] : snare polypectomy [With Cautery] : cautery [With APC] : APC [Prep Qualtiy: ___] : Prep Quality:  [unfilled] [Withdrawal Time: ___] : Withdrawal Time:  [unfilled] [Left Lateral Decubitus] : The patient was positioned in the left lateral decubitus position [Cecum (Landmarks/Transillum)] : and guided to the cecum which was identified by the anatomic landmarks of the appendiceal orifice and ileocecal valve and by transillumination in the right lower quadrant [No Difficulty] : without difficulty [Insufflated] : insufflated [Single Pass Needed] : after a single pass [Retroflex View] : a retroflex view of the rectum was performed [Hot Snare Polypectomy] : hot snare polypectomy [Diverticulosis] : diverticulosis [Cold Snare Polypectomy] : cold snare polypectomy [Vital Signs Stable] : the vital signs were stable [No Complications] : There were no complications [With Biopsy] : with biopsy [Propofol ___ mg IV] : Propofol [unfilled] ~Umg intravenously [Time started: ___] : Start Time:  [unfilled] [Time Completed: ___] : Completion Time:  [unfilled] [Varices] : varices - [Grade II] : Grade II [Polyps] : polyps [Normal] : Normal [Sent to Pathology] : was sent to pathology for analysis [Tolerated Well] : the patient tolerated the procedure well [Abnormal Rectum] : a normal rectum [External Hemorrhoids] : no external hemorrhoids [Patient Rotated Into Alternating Positions] : the patient was not rotated [de-identified] : Sessile rectal polyp on a rectal varix was seen and completely removed via hot snare polypectomy. Some oozing at polypectomy site which was controlled with APC. + large rectal varices were also seen. [de-identified] : History of esophageal and gastric varices secondary to non-alcoholic cirrhosis [de-identified] : 4 columns of Grade 2 esophageal varices were seen. No SRH. Improved form last evaluation. [de-identified] : Gastric varices were seen. Non bleeding [de-identified] : Biopsies taken for H. Pylori. [de-identified] : 3 antral polyps ranging in size from 8 mm. to 10mm. Biopsies taken. Separate biopsies taken for H. Pylori. [de-identified] : Antral polyps and esophageal and gastric varices decreased in size since last seen.

## 2021-04-23 NOTE — PHYSICAL EXAM
[General Appearance - Alert] : alert [General Appearance - In No Acute Distress] : in no acute distress [General Appearance - Well Nourished] : well nourished [General Appearance - Well Developed] : well developed [General Appearance - Well-Appearing] : healthy appearing [Sclera] : the sclera and conjunctiva were normal [PERRL With Normal Accommodation] : pupils were equal in size, round, and reactive to light [Extraocular Movements] : extraocular movements were intact [Outer Ear] : the ears and nose were normal in appearance [Oropharynx] : the oropharynx was normal [Examination Of The Oral Cavity] : the lips and gums were normal [Neck Appearance] : the appearance of the neck was normal [Neck Cervical Mass (___cm)] : no neck mass was observed [Jugular Venous Distention Increased] : there was no jugular-venous distention [Thyroid Diffuse Enlargement] : the thyroid was not enlarged [Thyroid Nodule] : there were no palpable thyroid nodules [Auscultation Breath Sounds / Voice Sounds] : lungs were clear to auscultation bilaterally [Heart Rate And Rhythm] : heart rate was normal and rhythm regular [Heart Sounds] : normal S1 and S2 [Heart Sounds Gallop] : no gallops [Murmurs] : no murmurs [Heart Sounds Pericardial Friction Rub] : no pericardial rub [Bowel Sounds] : normal bowel sounds [Abdomen Soft] : soft [Abdomen Tenderness] : non-tender [] : no hepato-splenomegaly [Abdomen Mass (___ Cm)] : no abdominal mass palpated [Patient Refused] : rectal exam was refused by the patient [No CVA Tenderness] : no ~M costovertebral angle tenderness [Abnormal Walk] : normal gait [Skin Color & Pigmentation] : normal skin color and pigmentation [Skin Turgor] : normal skin turgor [Deep Tendon Reflexes (DTR)] : deep tendon reflexes were 2+ and symmetric [Sensation] : the sensory exam was normal to light touch and pinprick [Motor Exam] : the motor exam was normal [No Focal Deficits] : no focal deficits [Oriented To Time, Place, And Person] : oriented to person, place, and time [FreeTextEntry1] : no asterixis or evidence of encephalopathy

## 2021-04-23 NOTE — REASON FOR VISIT
[Procedure: _________] : a [unfilled] procedure visit [FreeTextEntry1] : Pt is here for EGD for esophageal and gastric variceal surveillance and colonoscopy for h/o colon polyps [Other: ____] : a [unfilled]

## 2021-04-23 NOTE — PHYSICAL EXAM
[General Appearance - Alert] : alert [General Appearance - In No Acute Distress] : in no acute distress [General Appearance - Well Nourished] : well nourished [General Appearance - Well Developed] : well developed [General Appearance - Well-Appearing] : healthy appearing [Sclera] : the sclera and conjunctiva were normal [PERRL With Normal Accommodation] : pupils were equal in size, round, and reactive to light [Extraocular Movements] : extraocular movements were intact [Outer Ear] : the ears and nose were normal in appearance [Examination Of The Oral Cavity] : the lips and gums were normal [Oropharynx] : the oropharynx was normal [Neck Appearance] : the appearance of the neck was normal [Neck Cervical Mass (___cm)] : no neck mass was observed [Jugular Venous Distention Increased] : there was no jugular-venous distention [Thyroid Diffuse Enlargement] : the thyroid was not enlarged [Thyroid Nodule] : there were no palpable thyroid nodules [Auscultation Breath Sounds / Voice Sounds] : lungs were clear to auscultation bilaterally [Heart Rate And Rhythm] : heart rate was normal and rhythm regular [Heart Sounds] : normal S1 and S2 [Heart Sounds Gallop] : no gallops [Murmurs] : no murmurs [Heart Sounds Pericardial Friction Rub] : no pericardial rub [Bowel Sounds] : normal bowel sounds [Abdomen Soft] : soft [Abdomen Tenderness] : non-tender [] : no hepato-splenomegaly [Abdomen Mass (___ Cm)] : no abdominal mass palpated [Patient Refused] : rectal exam was refused by the patient [No CVA Tenderness] : no ~M costovertebral angle tenderness [Abnormal Walk] : normal gait [Skin Color & Pigmentation] : normal skin color and pigmentation [Skin Turgor] : normal skin turgor [Sensation] : the sensory exam was normal to light touch and pinprick [Deep Tendon Reflexes (DTR)] : deep tendon reflexes were 2+ and symmetric [Motor Exam] : the motor exam was normal [No Focal Deficits] : no focal deficits [Oriented To Time, Place, And Person] : oriented to person, place, and time [FreeTextEntry1] : no asterixis or evidence of encephalopathy

## 2021-04-23 NOTE — PROCEDURE
[Hx of Colon Polyps] : history of colon polyps [Bleeding] : bleeding risk [Infection] : risk of infection [Bowel Prep Kit] : the patient took the appropriate bowel preparation kit as directed [Approved Diet Followed] : the patient avoided solid foods and adhered to the approved diet list for 24 hours prior to the procedure [Procedure Explained] : The procedure was explained [Allergies Reviewed] : allergies reviewed. [Risks] : Risks [Benefits] : benefits [Alternatives] : alternatives [Consent Obtained] : written consent was obtained prior to the procedure and is detailed in the patient's record [Patient] : the patient [Automated Blood Pressure Cuff] : automated blood pressure cuff [Cardiac Monitor] : cardiac monitor [Pulse Oximeter] : pulse oximeter [___ L/min Oxygen via NC] : [unfilled] ~Uliters/minute oxygen via nasal cannula [3] : 3 [Sedation Clearance] : the patient was cleared for moderate sedation [Performed By: ___] : Performed by:  ALLEN [With Snare Polypectomy] : snare polypectomy [With Cautery] : cautery [With APC] : APC [Prep Qualtiy: ___] : Prep Quality:  [unfilled] [Withdrawal Time: ___] : Withdrawal Time:  [unfilled] [Left Lateral Decubitus] : The patient was positioned in the left lateral decubitus position [Cecum (Landmarks/Transillum)] : and guided to the cecum which was identified by the anatomic landmarks of the appendiceal orifice and ileocecal valve and by transillumination in the right lower quadrant [No Difficulty] : without difficulty [Insufflated] : insufflated [Single Pass Needed] : after a single pass [Retroflex View] : a retroflex view of the rectum was performed [Hot Snare Polypectomy] : hot snare polypectomy [Diverticulosis] : diverticulosis [Cold Snare Polypectomy] : cold snare polypectomy [Vital Signs Stable] : the vital signs were stable [No Complications] : There were no complications [With Biopsy] : with biopsy [Propofol ___ mg IV] : Propofol [unfilled] ~Umg intravenously [Time started: ___] : Start Time:  [unfilled] [Time Completed: ___] : Completion Time:  [unfilled] [Varices] : varices - [Grade II] : Grade II [Polyps] : polyps [Normal] : Normal [Sent to Pathology] : was sent to pathology for analysis [Tolerated Well] : the patient tolerated the procedure well [Abnormal Rectum] : a normal rectum [External Hemorrhoids] : no external hemorrhoids [Patient Rotated Into Alternating Positions] : the patient was not rotated [de-identified] : Sessile rectal polyp on a rectal varix was seen and completely removed via hot snare polypectomy. Some oozing at polypectomy site which was controlled with APC. + large rectal varices were also seen. [de-identified] : History of esophageal and gastric varices secondary to non-alcoholic cirrhosis [de-identified] : 4 columns of Grade 2 esophageal varices were seen. No SRH. Improved form last evaluation. [de-identified] : Gastric varices were seen. Non bleeding [de-identified] : Biopsies taken for H. Pylori. [de-identified] : 3 antral polyps ranging in size from 8 mm. to 10mm. Biopsies taken. Separate biopsies taken for H. Pylori. [de-identified] : Antral polyps and esophageal and gastric varices decreased in size since last seen.

## 2021-04-27 LAB — SURGICAL PATHOLOGY STUDY: SIGNIFICANT CHANGE UP

## 2021-05-18 ENCOUNTER — APPOINTMENT (OUTPATIENT)
Dept: RHEUMATOLOGY | Facility: CLINIC | Age: 71
End: 2021-05-18

## 2021-09-10 ENCOUNTER — APPOINTMENT (OUTPATIENT)
Dept: OBGYN | Facility: CLINIC | Age: 71
End: 2021-09-10

## 2021-10-27 ENCOUNTER — LABORATORY RESULT (OUTPATIENT)
Age: 71
End: 2021-10-27

## 2021-10-27 ENCOUNTER — APPOINTMENT (OUTPATIENT)
Dept: OBGYN | Facility: CLINIC | Age: 71
End: 2021-10-27
Payer: MEDICARE

## 2021-10-27 VITALS
HEIGHT: 59 IN | WEIGHT: 163 LBS | BODY MASS INDEX: 32.86 KG/M2 | DIASTOLIC BLOOD PRESSURE: 70 MMHG | SYSTOLIC BLOOD PRESSURE: 104 MMHG

## 2021-10-27 DIAGNOSIS — Z01.419 ENCOUNTER FOR GYNECOLOGICAL EXAMINATION (GENERAL) (ROUTINE) W/OUT ABNORMAL FINDINGS: ICD-10-CM

## 2021-10-27 DIAGNOSIS — Z01.411 ENCOUNTER FOR GYNECOLOGICAL EXAMINATION (GENERAL) (ROUTINE) WITH ABNORMAL FINDINGS: ICD-10-CM

## 2021-10-27 DIAGNOSIS — Z12.11 ENCOUNTER FOR SCREENING FOR MALIGNANT NEOPLASM OF COLON: ICD-10-CM

## 2021-10-27 DIAGNOSIS — Z12.31 ENCOUNTER FOR SCREENING MAMMOGRAM FOR MALIGNANT NEOPLASM OF BREAST: ICD-10-CM

## 2021-10-27 PROCEDURE — G0101: CPT

## 2021-10-27 RX ORDER — PANTOPRAZOLE 40 MG/1
40 TABLET, DELAYED RELEASE ORAL
Qty: 30 | Refills: 5 | Status: DISCONTINUED | COMMUNITY
Start: 2018-08-23 | End: 2021-10-27

## 2021-10-29 PROBLEM — Z01.419 WELL WOMAN EXAM WITH ROUTINE GYNECOLOGICAL EXAM: Status: RESOLVED | Noted: 2020-09-04 | Resolved: 2021-10-29

## 2021-10-29 PROBLEM — Z01.411 ENCOUNTER FOR WELL WOMAN EXAM WITH ABNORMAL FINDINGS: Status: RESOLVED | Noted: 2020-09-04 | Resolved: 2021-10-29

## 2021-10-29 PROBLEM — Z12.11 COLON CANCER SCREENING: Status: RESOLVED | Noted: 2020-09-04 | Resolved: 2021-10-29

## 2021-10-29 NOTE — END OF VISIT
[FreeTextEntry3] : I, Key Hurst solely acted as scribe for Dr. Desi Mondragon on 10/27/2021. All medical entries made by the Scribe were at my, Dr. Mondragon's, direction and personally dictated by me on 10/27/2021. I have reviewed the chart and agree that the record accurately reflects my personal performance of the history, physical exam, assessment, and plan. I have also personally directed, reviewed, and agreed with the chart.

## 2021-10-29 NOTE — DISCUSSION/SUMMARY
[FreeTextEntry1] : -Pap done today. Discussed need for colposcopy if HPV+ on this year's pap.\par \par -Umbilical hernia noted on exam, patient is asymptomatic.  \par \par -Prescription for mammogram screening given. \par \par -Self-breast exam reviewed. \par \par She will follow up annually and as needed.

## 2021-10-29 NOTE — PHYSICAL EXAM
[Appropriately responsive] : appropriately responsive [Alert] : alert [Soft] : soft [Non-tender] : non-tender [Non-distended] : non-distended [Oriented x3] : oriented x3 [Examination Of The Breasts] : a normal appearance [No Discharge] : no discharge [No Masses] : no breast masses were palpable [Labia Majora] : normal [Labia Minora] : normal [Atrophy] : atrophy [Dry Mucosa] : dry mucosa [Normal] : normal [Uterine Adnexae] : normal [FreeTextEntry7] : Umbilical hernia noted on exam

## 2021-10-29 NOTE — HISTORY OF PRESENT ILLNESS
[LMP unknown] : LMP unknown [postmenopausal] : postmenopausal [Y] : Positive pregnancy history [Menarche Age: ____] : age at menarche was [unfilled] [Men] : men [No] : No [N] : Patient is not sexually active [unknown] : Patient is unsure of the date of her LMP [Vaginal] : vaginal [Previously active] : previously active [FreeTextEntry1] : 71 year yo  is here for her annual visit. She is doing well with no gynecological complaints. \par \par LMP: unknown, she is postmenopausal. \par \par She is due for her mammogram screening.\par \par Her last screening colonoscopy was in , patient reports benign findings. \par \par Last cervical pap smear: 20, HPV+ [TextBox_4] : Pt presents for an annual exam [PapSmeardate] : 09/04/2020 [TextBox_31] : WNL, HPV POSITIVE [PGHxTotal] : 2 [[facility] : 2 [[facility]] : 2

## 2021-11-04 ENCOUNTER — NON-APPOINTMENT (OUTPATIENT)
Age: 71
End: 2021-11-04

## 2021-11-05 ENCOUNTER — NON-APPOINTMENT (OUTPATIENT)
Age: 71
End: 2021-11-05

## 2021-11-30 LAB
CYTOLOGY CVX/VAG DOC THIN PREP: ABNORMAL
HPV HIGH+LOW RISK DNA PNL CVX: DETECTED

## 2021-12-09 ENCOUNTER — APPOINTMENT (OUTPATIENT)
Dept: OBGYN | Facility: CLINIC | Age: 71
End: 2021-12-09
Payer: MEDICARE

## 2021-12-09 VITALS
WEIGHT: 159 LBS | HEIGHT: 59 IN | BODY MASS INDEX: 32.05 KG/M2 | DIASTOLIC BLOOD PRESSURE: 52 MMHG | SYSTOLIC BLOOD PRESSURE: 92 MMHG

## 2021-12-09 DIAGNOSIS — R87.810 CERVICAL HIGH RISK HUMAN PAPILLOMAVIRUS (HPV) DNA TEST POSITIVE: ICD-10-CM

## 2021-12-09 PROCEDURE — 57454 BX/CURETT OF CERVIX W/SCOPE: CPT

## 2021-12-09 NOTE — PROCEDURE
[Colposcopy] : Colposcopy  [Consent Obtained] : Consent obtained [Patient] : patient [LGSIL] : LGSIL [ECC Performed] : ECC performed [Biopsy] : biopsy taken [Hemostasis Obtained] : Hemostasis obtained [Tolerated Well] : the patient tolerated the procedure well [de-identified] : 1 [de-identified] : 12 o'clock

## 2021-12-09 NOTE — DISCUSSION/SUMMARY
[FreeTextEntry1] : -Colposcopy was done today. Cervical biopsy at 12 o'clock. ECC performed. Patient tolerated the\par procedure well with no complications. Post-procedure instructions were given. Patient expressed\par understanding. All questions were answered.\par \par -Follow up annually or as needed.\par

## 2021-12-09 NOTE — END OF VISIT
[FreeTextEntry3] : I, [Jay Pinto] solely acted as scribe for Dr. Desi Mondragon on 12/09/2021 \par All medical entries made by the Scribe were at my, Dr. Mondragon’s, direction and personally\par dictated by me on 12/09/2021 . I have reviewed the chart and agree that the record\par accurately reflects my personal performance of the history, physical exam, assessment and plan. I\par have also personally directed, reviewed, and agreed with the chart.

## 2021-12-09 NOTE — HISTORY OF PRESENT ILLNESS
[LMP unknown] : LMP unknown [Y] : Positive pregnancy history [unknown] : Patient is unsure of the date of her LMP [No] : Patient does not have concerns regarding sex [FreeTextEntry1] : ALESHIA 70 yo  LMP unknown is here today for a colposcopy procedure. Consent was obtained. \par \par Indication: LSIL and +HPV.\par  [PapSmeardate] : 10/27/21 [TextBox_31] : LSIL [HPVDate] : 10/27/21 [TextBox_78] : POSITIVE [PGHxTotal] : 3 [[facility]] : 2 [PGHxAbortions] : 1 [[facility]Living] : 2 [PGHxABInduced] : 1

## 2021-12-28 ENCOUNTER — NON-APPOINTMENT (OUTPATIENT)
Age: 71
End: 2021-12-28

## 2022-02-10 ENCOUNTER — NON-APPOINTMENT (OUTPATIENT)
Age: 72
End: 2022-02-10

## 2022-02-15 ENCOUNTER — APPOINTMENT (OUTPATIENT)
Dept: GASTROENTEROLOGY | Facility: CLINIC | Age: 72
End: 2022-02-15

## 2022-03-04 LAB — CORE LAB BIOPSY: NORMAL

## 2022-04-12 ENCOUNTER — APPOINTMENT (OUTPATIENT)
Dept: GASTROENTEROLOGY | Facility: CLINIC | Age: 72
End: 2022-04-12
Payer: MEDICARE

## 2022-04-12 VITALS
HEIGHT: 59 IN | OXYGEN SATURATION: 98 % | WEIGHT: 160 LBS | RESPIRATION RATE: 14 BRPM | DIASTOLIC BLOOD PRESSURE: 74 MMHG | SYSTOLIC BLOOD PRESSURE: 124 MMHG | HEART RATE: 81 BPM | BODY MASS INDEX: 32.25 KG/M2 | TEMPERATURE: 97.8 F

## 2022-04-12 PROCEDURE — 99215 OFFICE O/P EST HI 40 MIN: CPT

## 2022-04-23 ENCOUNTER — LABORATORY RESULT (OUTPATIENT)
Age: 72
End: 2022-04-23

## 2022-04-24 LAB
AFP-TM SERPL-MCNC: 1.9 NG/ML
ALBUMIN SERPL ELPH-MCNC: 3.6 G/DL
ALP BLD-CCNC: 135 U/L
ALT SERPL-CCNC: 34 U/L
ANION GAP SERPL CALC-SCNC: 11 MMOL/L
AST SERPL-CCNC: 33 U/L
BASOPHILS # BLD AUTO: 0.02 K/UL
BASOPHILS NFR BLD AUTO: 0.5 %
BILIRUB SERPL-MCNC: 0.8 MG/DL
BUN SERPL-MCNC: 17 MG/DL
CALCIUM SERPL-MCNC: 8.5 MG/DL
CHLORIDE SERPL-SCNC: 104 MMOL/L
CO2 SERPL-SCNC: 23 MMOL/L
CREAT SERPL-MCNC: 0.68 MG/DL
EGFR: 94 ML/MIN/1.73M2
EOSINOPHIL # BLD AUTO: 0.08 K/UL
EOSINOPHIL NFR BLD AUTO: 1.9 %
GLUCOSE SERPL-MCNC: 99 MG/DL
HCT VFR BLD CALC: 36.5 %
HGB BLD-MCNC: 11.1 G/DL
IMM GRANULOCYTES NFR BLD AUTO: 0.2 %
INR PPP: 1.57 RATIO
LYMPHOCYTES # BLD AUTO: 0.69 K/UL
LYMPHOCYTES NFR BLD AUTO: 16.4 %
MAN DIFF?: NORMAL
MCHC RBC-ENTMCNC: 28.2 PG
MCHC RBC-ENTMCNC: 30.4 GM/DL
MCV RBC AUTO: 92.6 FL
MONOCYTES # BLD AUTO: 0.54 K/UL
MONOCYTES NFR BLD AUTO: 12.8 %
NEUTROPHILS # BLD AUTO: 2.88 K/UL
NEUTROPHILS NFR BLD AUTO: 68.2 %
PLATELET # BLD AUTO: 194 K/UL
POTASSIUM SERPL-SCNC: 4.7 MMOL/L
PROT SERPL-MCNC: 7.2 G/DL
PT BLD: 18.5 SEC
RBC # BLD: 3.94 M/UL
RBC # FLD: 15.5 %
SODIUM SERPL-SCNC: 139 MMOL/L
WBC # FLD AUTO: 4.22 K/UL

## 2022-05-25 NOTE — ASSESSMENT
[FreeTextEntry1] : Impression: Nonalcoholic cirrhosis of liver without clinical evidence of ascites with esophageal and gastric varices when last evaluated roughly 1 year ago.  Personal history of colon polyps status post colonoscopy with polypectomy roughly 1 year ago.\par \par Recommendations: Patient to be sent for repeat lab work including CMP and CBC with differential and AFP and PT/INR as well as repeat abdominal sonography to reimage the liver pending repeat upper endoscopy which was advised to reevaluate her previously seen gastric and esophageal varices.  Follow-up colonoscopy is not required until 2024 or 3 years from her last colonoscopy in 2021 for continued colon polyp surveillance.  She is to continue her current nadolol and furosemide therapies for her cirrhosis.  She will also follow-up with our office hepatologist Dr. Valverde in 1 month's time and will be scheduled for the above repeat upper endoscopy at [facility].  She appeared to understand all of the above instructions and management plan and her ASA classification is 3.

## 2022-05-26 ENCOUNTER — TRANSCRIPTION ENCOUNTER (OUTPATIENT)
Age: 72
End: 2022-05-26

## 2022-05-26 LAB — SARS-COV-2 N GENE NPH QL NAA+PROBE: NOT DETECTED

## 2022-05-27 ENCOUNTER — OUTPATIENT (OUTPATIENT)
Dept: OUTPATIENT SERVICES | Facility: HOSPITAL | Age: 72
LOS: 1 days | End: 2022-05-27
Payer: MEDICARE

## 2022-05-27 ENCOUNTER — APPOINTMENT (OUTPATIENT)
Dept: GASTROENTEROLOGY | Facility: HOSPITAL | Age: 72
End: 2022-05-27

## 2022-05-27 DIAGNOSIS — K31.89 OTHER DISEASES OF STOMACH AND DUODENUM: ICD-10-CM

## 2022-05-27 DIAGNOSIS — Z98.890 OTHER SPECIFIED POSTPROCEDURAL STATES: Chronic | ICD-10-CM

## 2022-05-27 DIAGNOSIS — I85.00 ESOPHAGEAL VARICES WITHOUT BLEEDING: ICD-10-CM

## 2022-05-27 DIAGNOSIS — K74.60 UNSPECIFIED CIRRHOSIS OF LIVER: ICD-10-CM

## 2022-05-27 PROCEDURE — 43235 EGD DIAGNOSTIC BRUSH WASH: CPT

## 2022-06-27 ENCOUNTER — LABORATORY RESULT (OUTPATIENT)
Age: 72
End: 2022-06-27

## 2022-06-27 ENCOUNTER — APPOINTMENT (OUTPATIENT)
Dept: GASTROENTEROLOGY | Facility: CLINIC | Age: 72
End: 2022-06-27
Payer: MEDICARE

## 2022-06-27 VITALS
OXYGEN SATURATION: 98 % | DIASTOLIC BLOOD PRESSURE: 71 MMHG | WEIGHT: 159 LBS | RESPIRATION RATE: 16 BRPM | HEART RATE: 69 BPM | BODY MASS INDEX: 32.05 KG/M2 | TEMPERATURE: 98.6 F | SYSTOLIC BLOOD PRESSURE: 110 MMHG | HEIGHT: 59 IN

## 2022-06-27 PROCEDURE — 99205 OFFICE O/P NEW HI 60 MIN: CPT

## 2022-06-27 RX ORDER — FUROSEMIDE 40 MG/1
40 TABLET ORAL DAILY
Qty: 30 | Refills: 5 | Status: DISCONTINUED | COMMUNITY
Start: 2017-08-07 | End: 2022-06-27

## 2022-06-27 NOTE — REASON FOR VISIT
[Initial Evaluation] : an initial evaluation [Time Spent: ____ minutes] : Total time spent using  services: [unfilled] minutes. The patient's primary language is not English thus required  services. [FreeTextEntry1] : nonalcoholic cirrhosis, establishment of care [Interpreters_IDNumber] : [id number] [Interpreters_FullName] : Yumi

## 2022-06-27 NOTE — ADDENDUM
[FreeTextEntry1] : I, Lola Tenorio NP, acted as scribe for RENETTA Joseph for this patient encounter.

## 2022-06-27 NOTE — ASSESSMENT
[FreeTextEntry1] : ALESHIA MORENO is a 72 year old  female with a PMH significant for nonalcoholic cirrhosis with varices and ascites, HLD, anemia, asthma, history of blood clots (on xarelto), and hypothyroid. \par \par Disease progression of cirrhosis discussed, including but not limited to HCC, varices, encephalopathy, ascites, and death. \par \par HCC Screening\par -CT triphasic with and without contrast\par \par Variceal Screening\par -Last EGD done 5/27/22 showed grade 2 varices\par -Continue Carvedilol, may need to increase next visit to reach HR goal 60\par \par Encephalopathy\par -notify provider if new onset confusion\par -at least 1-2 bms/day\par \par Ascites\par -Continue Spirinolactone\par \par Diet\par -High Protein Low Fat Low Sugar Low Salt (up to 2G Na/day) Diet\par \par Pain\par -NO NSAIDS!!\par -Can take up to 2G Tylenol per day\par \par F/U hematology - can pt stop xarelto\par Labs ordered\par Follow up in 3 weeks with labs and imaging or sooner if needed\par

## 2022-06-27 NOTE — PHYSICAL EXAM
[Scleral Icterus] : No Scleral Icterus [Hepatojugular Reflux] : patient did not have a sustained hepatojugular reflux [Spider Angioma] : No spider angioma(s) were observed [Abdominal  Ascites] : no ascites [Non-Tender] : non-tender [Asterixis] : no asterixis observed [Jaundice] : No jaundice [Palmar Erythema] : no Palmar Erythema [Depression] : no depression [Hallucinations] : ~T no ~M hallucinations [Delusions] : no ~T delusions [Suicidal Ideation] : no suicidal ideation [Homicidal Ideation] : no homicidal ideations [Preoccupiation With Violent Thoughts] : no preoccupation with violent thoughts [General Appearance - Alert] : alert [General Appearance - In No Acute Distress] : in no acute distress [Sclera] : the sclera and conjunctiva were normal [Neck Appearance] : the appearance of the neck was normal [Bowel Sounds] : normal bowel sounds [Abdomen Soft] : soft [Abdomen Tenderness] : non-tender [Abdomen Mass (___ Cm)] : no abdominal mass palpated [Skin Color & Pigmentation] : normal skin color and pigmentation [Skin Turgor] : normal skin turgor [] : no rash [No Focal Deficits] : no focal deficits [Oriented To Time, Place, And Person] : oriented to person, place, and time [Impaired Insight] : insight and judgment were intact [Affect] : the affect was normal

## 2022-06-27 NOTE — HISTORY OF PRESENT ILLNESS
[Alcohol Abuse] : no alcohol abuse [de-identified] : ALESHIA MORENO is a 72 year old  female with a PMH significant for nonalcoholic cirrhosis with varices and ascites, HLD, anemia, asthma, history of blood clots (on xarelto), and hypothyroid. \par \par Pt was previously followed by Dr. Moser. EGD done 5/27/22 showed grade 2 varices. Currently managed on Carvedilol. Reports history of ascites, currently taking aldactone. Pt states she has a history of DVTs in which she is on Xarelto. Of note, pt was evaluated in the past at [facility] in the past for liver transplant but was deemed a nonsurgical candidate because of portal vein thrombosis.\par \par Previous labs April 2022 bilirubin 0.8, ast 33, alt 34, alkphos 135, inr 1.57, and AFP 1.9 US abdomen March 2021 impression: heterogeneous hepatic parenchyma which may be due to medical renal disease, no discrete lesions identified, splenomegaly which may represent portal hypertension in this patient with cirrhosis, 1.2 cm echogenic lesion which may represent a hemangioma.\par \par Denies heart disease, DM, fatigue, malaise, arthralgias, myalgias, pruritus, recent infection, abdominal pain or distension, jaundice, hematemesis, hematochezia, dark urine, confusion, unintentional weight loss or gain. Denies alcohol consumption.\par Reports aunt and grandfather had cirrhosis of unknown origin but otherwise denies family history of liver disease, sudden death or late trimester abortions.\par \par MELDNa 12\par Child Hoyt Class A

## 2022-07-09 ENCOUNTER — APPOINTMENT (OUTPATIENT)
Dept: CT IMAGING | Facility: CLINIC | Age: 72
End: 2022-07-09

## 2022-07-12 LAB
AFP-TM SERPL-MCNC: 2 NG/ML
ALBUMIN SERPL ELPH-MCNC: 3.8 G/DL
ALP BLD-CCNC: 92 U/L
ALT SERPL-CCNC: 14 U/L
ANION GAP SERPL CALC-SCNC: 11 MMOL/L
AST SERPL-CCNC: 24 U/L
BASOPHILS # BLD AUTO: 0.03 K/UL
BASOPHILS NFR BLD AUTO: 0.8 %
BILIRUB INDIRECT SERPL-MCNC: 0.4 MG/DL
BILIRUB SERPL-MCNC: 0.6 MG/DL
BUN SERPL-MCNC: 20 MG/DL
CALCIUM SERPL-MCNC: 8.6 MG/DL
CHLORIDE SERPL-SCNC: 104 MMOL/L
CO2 SERPL-SCNC: 25 MMOL/L
CREAT SERPL-MCNC: 0.82 MG/DL
EGFR: 77 ML/MIN/1.73M2
EOSINOPHIL # BLD AUTO: 0.14 K/UL
EOSINOPHIL NFR BLD AUTO: 3.7 %
GLUCOSE SERPL-MCNC: 117 MG/DL
HCT VFR BLD CALC: 31.4 %
HGB BLD-MCNC: 9.7 G/DL
IMM GRANULOCYTES NFR BLD AUTO: 0.3 %
INR PPP: 1.34 RATIO
LYMPHOCYTES # BLD AUTO: 0.71 K/UL
LYMPHOCYTES NFR BLD AUTO: 18.5 %
MAN DIFF?: NORMAL
MCHC RBC-ENTMCNC: 27.4 PG
MCHC RBC-ENTMCNC: 30.9 GM/DL
MCV RBC AUTO: 88.7 FL
MONOCYTES # BLD AUTO: 0.47 K/UL
MONOCYTES NFR BLD AUTO: 12.3 %
NEUTROPHILS # BLD AUTO: 2.47 K/UL
NEUTROPHILS NFR BLD AUTO: 64.4 %
PLATELET # BLD AUTO: 173 K/UL
POTASSIUM SERPL-SCNC: 3.9 MMOL/L
PROT SERPL-MCNC: 7.3 G/DL
PT BLD: 15.7 SEC
RBC # BLD: 3.54 M/UL
RBC # FLD: 15.8 %
SODIUM SERPL-SCNC: 140 MMOL/L
WBC # FLD AUTO: 3.83 K/UL

## 2022-07-20 ENCOUNTER — APPOINTMENT (OUTPATIENT)
Dept: GASTROENTEROLOGY | Facility: CLINIC | Age: 72
End: 2022-07-20

## 2022-07-21 ENCOUNTER — OUTPATIENT (OUTPATIENT)
Dept: OUTPATIENT SERVICES | Facility: HOSPITAL | Age: 72
LOS: 1 days | End: 2022-07-21
Payer: MEDICARE

## 2022-07-21 ENCOUNTER — APPOINTMENT (OUTPATIENT)
Dept: CT IMAGING | Facility: CLINIC | Age: 72
End: 2022-07-21

## 2022-07-21 DIAGNOSIS — K74.60 UNSPECIFIED CIRRHOSIS OF LIVER: ICD-10-CM

## 2022-07-21 DIAGNOSIS — Z98.890 OTHER SPECIFIED POSTPROCEDURAL STATES: Chronic | ICD-10-CM

## 2022-07-21 PROCEDURE — 74170 CT ABD WO CNTRST FLWD CNTRST: CPT | Mod: 26

## 2022-07-21 PROCEDURE — 74170 CT ABD WO CNTRST FLWD CNTRST: CPT

## 2022-07-25 ENCOUNTER — RESULT REVIEW (OUTPATIENT)
Age: 72
End: 2022-07-25

## 2022-07-25 ENCOUNTER — APPOINTMENT (OUTPATIENT)
Dept: GASTROENTEROLOGY | Facility: CLINIC | Age: 72
End: 2022-07-25

## 2022-07-25 VITALS
HEIGHT: 59 IN | RESPIRATION RATE: 16 BRPM | DIASTOLIC BLOOD PRESSURE: 68 MMHG | WEIGHT: 156 LBS | OXYGEN SATURATION: 98 % | SYSTOLIC BLOOD PRESSURE: 114 MMHG | BODY MASS INDEX: 31.45 KG/M2 | HEART RATE: 73 BPM | TEMPERATURE: 97.8 F

## 2022-07-25 PROCEDURE — 99205 OFFICE O/P NEW HI 60 MIN: CPT

## 2022-07-25 RX ORDER — CARVEDILOL 6.25 MG/1
6.25 TABLET, FILM COATED ORAL TWICE DAILY
Qty: 60 | Refills: 5 | Status: DISCONTINUED | COMMUNITY
Start: 2017-06-26 | End: 2022-07-25

## 2022-07-25 RX ORDER — PRAVASTATIN SODIUM 10 MG/1
10 TABLET ORAL
Refills: 0 | Status: ACTIVE | COMMUNITY

## 2022-07-25 NOTE — ASSESSMENT
[FreeTextEntry1] : ALESHIA MORENO is a 72 year old  female with a PMH significant for nonalcoholic cirrhosis with varices and ascites, HLD, anemia, asthma, history of blood clots (on Xarelto), and hypothyroid. \par \par Disease progression of cirrhosis discussed, including but not limited to HCC, varices, encephalopathy, ascites, and death. \par \par HCC Screening\par -CT triphasic with and without contrast done, results pending\par -AFP 2.0\par \par Variceal Screening\par -Last EGD 5/27/22 showed grade 2 varices\par -increase Carvedilol 6.25 mg to 1 tablet in AM and 1/2 tablet in evening\par -if pt experiences hematemesis, advised to go to ER immediately\par \par Encephalopathy\par -notify provider if new onset confusion\par -at least 1-2 bms/day\par \par Ascites\par -Continue Spirinolactone 100 mg BID\par \par Diet\par -High Protein Low Fat Low Sugar Low Salt (up to 2G Na/day) Diet\par -No prolonged fasting\par -Exercise and weight loss (lose 10% of current weight)\par -Mediterranean Diet info provided\par -No alcohol consumption\par \par Pain\par -NO NSAIDS!!\par -Can take up to 2G Tylenol per day\par \par Transplant\par -MELDNa 10\par \par Follow Up\par -Follow up in 6 months with labs and imaging or sooner if needed\par

## 2022-07-25 NOTE — PHYSICAL EXAM
[Non-Tender] : non-tender [General Appearance - Alert] : alert [General Appearance - In No Acute Distress] : in no acute distress [Sclera] : the sclera and conjunctiva were normal [Neck Appearance] : the appearance of the neck was normal [Heart Rate And Rhythm] : heart rate was normal and rhythm regular [Edema] : there was no peripheral edema [Bowel Sounds] : normal bowel sounds [Abdomen Soft] : soft [Abdomen Tenderness] : non-tender [Abdomen Mass (___ Cm)] : no abdominal mass palpated [Abnormal Walk] : normal gait [Skin Color & Pigmentation] : normal skin color and pigmentation [Skin Turgor] : normal skin turgor [] : no rash [No Focal Deficits] : no focal deficits [Oriented To Time, Place, And Person] : oriented to person, place, and time [Impaired Insight] : insight and judgment were intact [Affect] : the affect was normal [Scleral Icterus] : No Scleral Icterus [Hepatojugular Reflux] : patient did not have a sustained hepatojugular reflux [Spider Angioma] : No spider angioma(s) were observed [Abdominal  Ascites] : no ascites [Asterixis] : no asterixis observed [Jaundice] : No jaundice [Palmar Erythema] : no Palmar Erythema [Depression] : no depression [Hallucinations] : ~T no ~M hallucinations [Delusions] : no ~T delusions [Suicidal Ideation] : no suicidal ideation [Homicidal Ideation] : no homicidal ideations [Preoccupiation With Violent Thoughts] : no preoccupation with violent thoughts

## 2022-07-25 NOTE — HISTORY OF PRESENT ILLNESS
[MELD Score: ___] : MELD Score is [unfilled] [de-identified] : ALESHIA MORENO is a 72 year old  female with a PMH significant for nonalcoholic cirrhosis with varices and ascites, HLD, anemia, asthma, history of blood clots (on Xarelto), and hypothyroid. \par \par Since last pt states she is feeling well. Labs and CT scan done. Labs July 2022 bilirubin 0.6, AST 24, ALT 14, AlkPhos 92, INR 1.34, and AFP 2.0 US abdomen March 2021 impression: heterogeneous hepatic parenchyma which may be due to medical renal disease, no discrete lesions identified, splenomegaly which may represent portal hypertension in this patient with cirrhosis, 1.2 cm echogenic lesion which may represent a hemangioma. CT done last week, results still pending.\par \par EGD done 5/27/22 showed grade 2 varices. Denies hematemesis or melena. Currently managed on Carvedilol 6.25mg 1/2 tablet in morning and evening. HR today is 73. Pt not adequately beta blocked. Reports history of ascites, currently taking Spironolactone. Denies increase abdominal distension. Pt states she has a history of DVTs in which she is on Xarelto. Of note, pt was evaluated in the past at [facility] in the past for liver transplant but was deemed a nonsurgical candidate because of portal vein thrombosis.\par \par Denies heart disease, DM, fatigue, malaise, arthralgias, myalgias, pruritus, recent infection, jaundice, dark urine, confusion, unintentional weight loss or gain. Denies alcohol consumption.

## 2022-07-25 NOTE — REASON FOR VISIT
[Follow-Up: _____] : a [unfilled] follow-up visit [[employer] ] : provided by [employer]   [Time Spent: ____ minutes] : Total time spent using  services: [unfilled] minutes. The patient's primary language is not English thus required  services. [FreeTextEntry1] : cirrhosis [Interpreters_IDNumber] : [id number] [Interpreters_FullName] : Yumi

## 2022-08-03 ENCOUNTER — NON-APPOINTMENT (OUTPATIENT)
Age: 72
End: 2022-08-03

## 2022-08-04 ENCOUNTER — NON-APPOINTMENT (OUTPATIENT)
Age: 72
End: 2022-08-04

## 2022-08-10 ENCOUNTER — APPOINTMENT (OUTPATIENT)
Dept: GASTROENTEROLOGY | Facility: CLINIC | Age: 72
End: 2022-08-10

## 2022-08-10 VITALS
RESPIRATION RATE: 14 BRPM | WEIGHT: 150 LBS | BODY MASS INDEX: 30.24 KG/M2 | SYSTOLIC BLOOD PRESSURE: 106 MMHG | DIASTOLIC BLOOD PRESSURE: 63 MMHG | HEIGHT: 59 IN | HEART RATE: 74 BPM | OXYGEN SATURATION: 98 %

## 2022-08-10 PROCEDURE — 99214 OFFICE O/P EST MOD 30 MIN: CPT

## 2022-08-10 RX ORDER — MOMETASONE FUROATE 1 MG/G
0.1 CREAM TOPICAL
Qty: 45 | Refills: 0 | Status: ACTIVE | COMMUNITY
Start: 2022-06-30

## 2022-08-10 RX ORDER — DENOSUMAB 60 MG/ML
60 INJECTION SUBCUTANEOUS
Qty: 1 | Refills: 0 | Status: ACTIVE | COMMUNITY
Start: 2022-03-16

## 2022-08-10 RX ORDER — HYDROXYCHLOROQUINE SULFATE 200 MG/1
200 TABLET, FILM COATED ORAL
Qty: 180 | Refills: 0 | Status: ACTIVE | COMMUNITY
Start: 2022-07-25

## 2022-08-10 NOTE — ASSESSMENT
[FreeTextEntry1] : Patient is doing well at this time.  However she is at high risk for any endoscopic intervention even diagnostic FNA.  Patient will be evaluated with EGD/EUS.  If there is any alarming features on the pancreatic cystic lesion, FNA will be performed.  Otherwise we will observe with imaging. Risks (including bleeding, pain, perforation, incomplete examination, adverse reactions to medications, aspiration and death), benefits and alternatives were discussed. Patient is agreeable for the EGD/EUS. The patient is medically optimized for the procedure. We will schedule the patient for the procedure.\par \par Hold Xarelto 3 days before procedure.\par \par James Dimas MD\par Gastroenterology \par \par

## 2022-08-10 NOTE — HISTORY OF PRESENT ILLNESS
[de-identified] : Patient was referred for evaluation of pancreatic cystic lesion.  The patient follows up with Dr. Moser and Dr.Nazia Valverde.  Patient has nonalcoholic steatohepatitis related cirrhosis, portal hypertension, esophageal varices.  She has no history of pancreatitis.  No alcohol abuse.  Recent imaging shows 2.5 cm cystic lesion in the head of the pancreas and further evaluation was recommended.  She had a EGD performed which has revealed grade 2 esophageal varices.  She has been on beta-blocker.  She is on diuretics.She is on Xarelto.

## 2022-08-16 LAB
ALBUMIN SERPL ELPH-MCNC: 3.5 G/DL
ALP BLD-CCNC: 94 U/L
ALT SERPL-CCNC: 13 U/L
ANION GAP SERPL CALC-SCNC: 11 MMOL/L
AST SERPL-CCNC: 21 U/L
BASOPHILS # BLD AUTO: 0.04 K/UL
BASOPHILS NFR BLD AUTO: 1 %
BILIRUB SERPL-MCNC: 0.8 MG/DL
BUN SERPL-MCNC: 16 MG/DL
CALCIUM SERPL-MCNC: 8.8 MG/DL
CANCER AG19-9 SERPL-ACNC: 5 U/ML
CHLORIDE SERPL-SCNC: 108 MMOL/L
CO2 SERPL-SCNC: 22 MMOL/L
CREAT SERPL-MCNC: 0.75 MG/DL
EGFR: 86 ML/MIN/1.73M2
EOSINOPHIL # BLD AUTO: 0.12 K/UL
EOSINOPHIL NFR BLD AUTO: 3.1 %
GLUCOSE SERPL-MCNC: 88 MG/DL
HCT VFR BLD CALC: 29 %
HGB BLD-MCNC: 8.9 G/DL
IMM GRANULOCYTES NFR BLD AUTO: 0.3 %
INR PPP: 2.34 RATIO
LYMPHOCYTES # BLD AUTO: 0.72 K/UL
LYMPHOCYTES NFR BLD AUTO: 18.6 %
MAN DIFF?: NORMAL
MCHC RBC-ENTMCNC: 25.6 PG
MCHC RBC-ENTMCNC: 30.7 GM/DL
MCV RBC AUTO: 83.6 FL
MONOCYTES # BLD AUTO: 0.37 K/UL
MONOCYTES NFR BLD AUTO: 9.6 %
NEUTROPHILS # BLD AUTO: 2.61 K/UL
NEUTROPHILS NFR BLD AUTO: 67.4 %
PLATELET # BLD AUTO: 194 K/UL
POTASSIUM SERPL-SCNC: 4.5 MMOL/L
PROT SERPL-MCNC: 7.4 G/DL
PT BLD: 27.4 SEC
RBC # BLD: 3.47 M/UL
RBC # FLD: 17.1 %
SODIUM SERPL-SCNC: 140 MMOL/L
WBC # FLD AUTO: 3.87 K/UL

## 2022-08-31 ENCOUNTER — NON-APPOINTMENT (OUTPATIENT)
Age: 72
End: 2022-08-31

## 2022-09-22 ENCOUNTER — OUTPATIENT (OUTPATIENT)
Dept: OUTPATIENT SERVICES | Facility: HOSPITAL | Age: 72
LOS: 1 days | End: 2022-09-22
Payer: MEDICARE

## 2022-09-22 ENCOUNTER — APPOINTMENT (OUTPATIENT)
Dept: GASTROENTEROLOGY | Facility: HOSPITAL | Age: 72
End: 2022-09-22

## 2022-09-22 ENCOUNTER — TRANSCRIPTION ENCOUNTER (OUTPATIENT)
Age: 72
End: 2022-09-22

## 2022-09-22 ENCOUNTER — RESULT REVIEW (OUTPATIENT)
Age: 72
End: 2022-09-22

## 2022-09-22 DIAGNOSIS — Z98.890 OTHER SPECIFIED POSTPROCEDURAL STATES: Chronic | ICD-10-CM

## 2022-09-22 DIAGNOSIS — K86.2 CYST OF PANCREAS: ICD-10-CM

## 2022-09-22 LAB
APTT BLD: 32.9 SEC — SIGNIFICANT CHANGE UP (ref 27.5–35.5)
INR BLD: 1.17 RATIO — HIGH (ref 0.88–1.16)
PROTHROM AB SERPL-ACNC: 13.6 SEC — HIGH (ref 10.5–13.4)

## 2022-09-22 PROCEDURE — 88341 IMHCHEM/IMCYTCHM EA ADD ANTB: CPT

## 2022-09-22 PROCEDURE — 43239 EGD BIOPSY SINGLE/MULTIPLE: CPT

## 2022-09-22 PROCEDURE — 85610 PROTHROMBIN TIME: CPT

## 2022-09-22 PROCEDURE — 88305 TISSUE EXAM BY PATHOLOGIST: CPT | Mod: 26

## 2022-09-22 PROCEDURE — 36415 COLL VENOUS BLD VENIPUNCTURE: CPT

## 2022-09-22 PROCEDURE — 88305 TISSUE EXAM BY PATHOLOGIST: CPT

## 2022-09-22 PROCEDURE — 88342 IMHCHEM/IMCYTCHM 1ST ANTB: CPT | Mod: 26

## 2022-09-22 PROCEDURE — 85730 THROMBOPLASTIN TIME PARTIAL: CPT

## 2022-09-22 PROCEDURE — 88342 IMHCHEM/IMCYTCHM 1ST ANTB: CPT

## 2022-09-22 PROCEDURE — 88341 IMHCHEM/IMCYTCHM EA ADD ANTB: CPT | Mod: 26

## 2022-09-22 PROCEDURE — 43259 EGD US EXAM DUODENUM/JEJUNUM: CPT

## 2022-09-22 PROCEDURE — 43237 ENDOSCOPIC US EXAM ESOPH: CPT

## 2022-09-22 NOTE — PHYSICAL EXAM
[Alert] : alert [Normal Voice/Communication] : normal voice/communication [Healthy Appearing] : healthy appearing [No Acute Distress] : no acute distress [Sclera] : the sclera and conjunctiva were normal [Hearing Threshold Finger Rub Not [place]] : hearing was normal [Normal Lips/Gums] : the lips and gums were normal [Oropharynx] : the oropharynx was normal [Normal Appearance] : the appearance of the neck was normal [No Neck Mass] : no neck mass was observed [No Respiratory Distress] : no respiratory distress [No Acc Muscle Use] : no accessory muscle use [Respiration, Rhythm And Depth] : normal respiratory rhythm and effort [Auscultation Breath Sounds / Voice Sounds] : lungs were clear to auscultation bilaterally [Heart Rate And Rhythm] : heart rate was normal and rhythm regular [Normal S1, S2] : normal S1 and S2 [Murmurs] : no murmurs [Bowel Sounds] : normal bowel sounds [Abdomen Tenderness] : non-tender [No Masses] : no abdominal mass palpated [Abdomen Soft] : soft [] : no hepatosplenomegaly [Oriented To Time, Place, And Person] : oriented to person, place, and time

## 2022-10-03 LAB — SURGICAL PATHOLOGY STUDY: SIGNIFICANT CHANGE UP

## 2022-11-28 ENCOUNTER — APPOINTMENT (OUTPATIENT)
Dept: OBGYN | Facility: CLINIC | Age: 72
End: 2022-11-28
Payer: MEDICARE

## 2022-11-28 ENCOUNTER — LABORATORY RESULT (OUTPATIENT)
Age: 72
End: 2022-11-28

## 2022-11-28 VITALS
HEIGHT: 59 IN | BODY MASS INDEX: 29.84 KG/M2 | DIASTOLIC BLOOD PRESSURE: 60 MMHG | WEIGHT: 148 LBS | SYSTOLIC BLOOD PRESSURE: 102 MMHG

## 2022-11-28 DIAGNOSIS — R87.612 LOW GRADE SQUAMOUS INTRAEPITHELIAL LESION ON CYTOLOGIC SMEAR OF CERVIX (LGSIL): ICD-10-CM

## 2022-11-28 DIAGNOSIS — Z12.31 ENCOUNTER FOR SCREENING MAMMOGRAM FOR MALIGNANT NEOPLASM OF BREAST: ICD-10-CM

## 2022-11-28 DIAGNOSIS — Z01.818 ENCOUNTER FOR OTHER PREPROCEDURAL EXAMINATION: ICD-10-CM

## 2022-11-28 DIAGNOSIS — Z09 ENCOUNTER FOR FOLLOW-UP EXAMINATION AFTER COMPLETED TREATMENT FOR CONDITIONS OTHER THAN MALIGNANT NEOPLASM: ICD-10-CM

## 2022-11-28 PROCEDURE — G0101: CPT

## 2022-11-28 PROCEDURE — 99397 PER PM REEVAL EST PAT 65+ YR: CPT | Mod: GY

## 2022-11-30 NOTE — PLAN
[FreeTextEntry1] : F/U pap. If positive HPV again and/or if abnormal pap, will need colpocopy again.\par \par F/U mammo.

## 2022-11-30 NOTE — HISTORY OF PRESENT ILLNESS
[Y] : Positive pregnancy history [Menarche Age: ____] : age at menarche was [unfilled] [N] : Patient is not sexually active [No] : Patient does not have concerns regarding sex [Previously active] : previously active [PapSmeardate] : 10/27/21 [TextBox_31] : LSIL [ColonoscopyDate] : 04/28/21 [HPVDate] : 10/27/21 [TextBox_78] : POS [LMPDate] : 06/01/80 [PGHxTotal] : 3 [[facility]] : 2 [[facility]] : 2 [PGHxABInduced] : 1 [FreeTextEntry1] : 06/01/80

## 2022-12-01 ENCOUNTER — NON-APPOINTMENT (OUTPATIENT)
Age: 72
End: 2022-12-01

## 2022-12-07 ENCOUNTER — NON-APPOINTMENT (OUTPATIENT)
Age: 72
End: 2022-12-07

## 2022-12-09 LAB
CYTOLOGY CVX/VAG DOC THIN PREP: ABNORMAL
HPV HIGH+LOW RISK DNA PNL CVX: DETECTED

## 2022-12-21 ENCOUNTER — APPOINTMENT (OUTPATIENT)
Dept: PULMONOLOGY | Facility: CLINIC | Age: 72
End: 2022-12-21

## 2022-12-21 VITALS
HEIGHT: 59 IN | OXYGEN SATURATION: 99 % | BODY MASS INDEX: 28.02 KG/M2 | RESPIRATION RATE: 16 BRPM | SYSTOLIC BLOOD PRESSURE: 91 MMHG | HEART RATE: 70 BPM | DIASTOLIC BLOOD PRESSURE: 52 MMHG | WEIGHT: 139 LBS

## 2022-12-21 DIAGNOSIS — J90 PLEURAL EFFUSION, NOT ELSEWHERE CLASSIFIED: ICD-10-CM

## 2022-12-21 DIAGNOSIS — G47.33 OBSTRUCTIVE SLEEP APNEA (ADULT) (PEDIATRIC): ICD-10-CM

## 2022-12-21 DIAGNOSIS — R93.89 ABNORMAL FINDINGS ON DIAGNOSTIC IMAGING OF OTHER SPECIFIED BODY STRUCTURES: ICD-10-CM

## 2022-12-21 DIAGNOSIS — J45.909 UNSPECIFIED ASTHMA, UNCOMPLICATED: ICD-10-CM

## 2022-12-21 DIAGNOSIS — R06.02 SHORTNESS OF BREATH: ICD-10-CM

## 2022-12-21 DIAGNOSIS — E66.3 OVERWEIGHT: ICD-10-CM

## 2022-12-21 PROCEDURE — 99215 OFFICE O/P EST HI 40 MIN: CPT

## 2022-12-21 RX ORDER — AMOXICILLIN 500 MG/1
500 CAPSULE ORAL
Qty: 21 | Refills: 0 | Status: DISCONTINUED | COMMUNITY
Start: 2022-04-10 | End: 2022-12-21

## 2022-12-21 RX ORDER — ALBUTEROL SULFATE 90 UG/1
108 (90 BASE) INHALANT RESPIRATORY (INHALATION)
Qty: 1 | Refills: 1 | Status: ACTIVE | COMMUNITY
Start: 2022-12-21 | End: 1900-01-01

## 2022-12-21 RX ORDER — DOXYCYCLINE HYCLATE 100 MG/1
100 CAPSULE ORAL
Qty: 14 | Refills: 0 | Status: DISCONTINUED | COMMUNITY
Start: 2022-05-30 | End: 2022-12-21

## 2022-12-21 NOTE — CONSULT LETTER
[Dear  ___] : Dear  [unfilled], [Courtesy Letter:] : I had the pleasure of seeing your patient, [unfilled], in my office today. [Please see my note below.] : Please see my note below. [Consult Closing:] : Thank you very much for allowing me to participate in the care of this patient.  If you have any questions, please do not hesitate to contact me. [Sincerely,] : Sincerely, [FreeTextEntry3] : Abram Archer MD, FCCP, D. ABSM\par Pulmonary and Sleep Medicine\par [facility] [place] \par

## 2022-12-21 NOTE — REVIEW OF SYSTEMS
[Negative] : Endocrine [SOB on Exertion] : sob on exertion [GERD] : gerd [Anemia] : anemia [Thyroid Problem] : thyroid problem

## 2022-12-21 NOTE — DISCUSSION/SUMMARY
[FreeTextEntry1] : \par #1. Spirometry performed previously was essentially normal; will repeat PFTs\par #2. The patient does not appear to require chronic BD therapy at this time\par #3. SOBOE is likely at least somewhat related to weight or deconditioning given PFT findings\par #4. Diet and exercise for weight loss \par #5. Albuterol inhaler as needed.\par #6. Chest CT to f/u previously seen R effusion\par #7. Home PSG to evaluate for possible JAKY. \par #8. Consider PAP therapy for significant JAKY \par #9. Pt had both Covid vaccines and booster; s/p Covid infection\par #10. F/u in 3 months with chest CT, PFTs, and HST\par #11. Reviewed risks of exposure and symptoms of Covid-19 virus, including how the virus is spread and precautions to avoid natacha virus. \par \par The patient expressed understanding and agreement with the above recommendations/plan and accepts responsibility to be compliant with recommended testing, therapies, and f/u visits.\par All relevant questions and concerns were addressed.

## 2022-12-21 NOTE — HISTORY OF PRESENT ILLNESS
[Never] : never [Intermittent] : intermittent [On ___] : performed on [unfilled] [Patient] : the patient [To Assess ___] : to assess [unfilled] [Follow-Up - Routine Clinic] : a routine clinic follow-up of [Unrefreshing Sleep] : unrefreshing sleep [Sleepy When Sedentary] : sleepy when sedentary [Currently Experiencing] : The patient is currently experiencing symptoms. [None] : The patient is not currently being treated for this problem [TextBox_4] : No c/o SOB, cough or wheeze\par Never smoker.\par Has not been on inhalers in > 2 years.\par Has a h/o liver cirrhosis and may require transplant.\par Pt s/p Covid infection in early 2021 but with only nausea/vomiting; did not require therapy.\par Prior CT with R effusion requiring drainage but may have been related to her h/o liver cirrhosis.\par Pt last seen 2/2021\par  [Excessive Daytime Sleepiness] : no excessive daytime sleepiness [Witnessed Apnea During Sleep] : no witnessed apnea during sleep [Witnessed Gasping During Sleep] : no witnessed gasping during sleep [Snoring] : no snoring [FreeTextEntry8] : small loc R pl effusion with pleural thickening with improved CLAUDE and lingular GGO and stable TOMMY

## 2022-12-21 NOTE — REASON FOR VISIT
[Follow-Up] : a follow-up visit [Asthma] : asthma [Shortness of Breath] : shortness of breath [Abnormal CXR/ Chest CT] : an abnormal CXR/ chest CT [Sleep Apnea] : sleep apnea [Ad Hoc ] : provided by an ad hoc  [TextBox_44] : Prior Covid infection, pleural effusion [Interpreters_FullName] : Сергей [Interpreters_Relationshiptopatient] : MA [TWNoteComboBox1] : Finnish

## 2022-12-27 ENCOUNTER — APPOINTMENT (OUTPATIENT)
Dept: OBGYN | Facility: CLINIC | Age: 72
End: 2022-12-27
Payer: MEDICARE

## 2022-12-27 VITALS
SYSTOLIC BLOOD PRESSURE: 120 MMHG | DIASTOLIC BLOOD PRESSURE: 80 MMHG | BODY MASS INDEX: 28.02 KG/M2 | HEIGHT: 59 IN | WEIGHT: 139 LBS

## 2022-12-27 DIAGNOSIS — R87.612 LOW GRADE SQUAMOUS INTRAEPITHELIAL LESION ON CYTOLOGIC SMEAR OF CERVIX (LGSIL): ICD-10-CM

## 2022-12-27 DIAGNOSIS — Z01.419 ENCOUNTER FOR GYNECOLOGICAL EXAMINATION (GENERAL) (ROUTINE) W/OUT ABNORMAL FINDINGS: ICD-10-CM

## 2022-12-27 PROCEDURE — 57454 BX/CURETT OF CERVIX W/SCOPE: CPT

## 2022-12-27 NOTE — PROCEDURE
[Colposcopy] : Colposcopy  [Time out performed] : Pre-procedure time out performed.  Patient's name, date of birth and procedure confirmed. [Consent Obtained] : Consent obtained [Risks] : risks [Benefits] : benefits [Alternatives] : alternatives [Patient] : patient [Colposcopy Adequate] : colposcopy adequate [Tolerated Well] : the patient tolerated the procedure well [LGSIL] : LGSIL [SCI Fully Visualized] : SCI fully visualized

## 2022-12-29 NOTE — DISCUSSION/SUMMARY
[FreeTextEntry1] : 73 y/o who presents for colposcopy.    \par \par \par -Pt tolerated colposcopy procedure well today on 12/27. R/B/A discussed, pt agreeable. \par \par \par rto: Call in 2 weeks with colpo results.

## 2022-12-29 NOTE — HISTORY OF PRESENT ILLNESS
[LMP unknown] : LMP unknown [N] : Patient is not sexually active [Y] : Positive pregnancy history [unknown] : Patient is unsure of the date of her LMP [Menarche Age: ____] : age at menarche was [unfilled] [Previously active] : previously active [FreeTextEntry1] : Mare Newell Sylvia F   here for Colposcopy.\par \par LMP unkown. \par \par Last Pap Smear completed on 22, LGSIL.\par \par Contraception: Patient does not use contraception and is not sexually active. \par \par \par \par \par  [PapSmeardate] : 11/28/22 [TextBox_31] : LGSIL [HPVDate] : 11/28/22 [TextBox_78] : NEG [PGHxTotal] : 3 [[facility]Full[facility]] : 2 [PGHxAbortions] : 1 [[facility]xLiving] : 2

## 2022-12-29 NOTE — END OF VISIT
[FreeTextEntry3] : I Petrona Colon wrote this note acting  as a scribe for Dr. Giancarlo Weeks MD on Dec 27, 2022. I Dr. Giancarlo Weeks MD, ordering physician, have read and attest that all the information, medical decision making and discharge instructions within are true and accurate on 12/27/2022.

## 2023-01-13 DIAGNOSIS — K86.2 CYST OF PANCREAS: ICD-10-CM

## 2023-01-20 ENCOUNTER — APPOINTMENT (OUTPATIENT)
Dept: GASTROENTEROLOGY | Facility: CLINIC | Age: 73
End: 2023-01-20
Payer: MEDICARE

## 2023-01-20 ENCOUNTER — NON-APPOINTMENT (OUTPATIENT)
Age: 73
End: 2023-01-20

## 2023-01-20 VITALS
BODY MASS INDEX: 28.63 KG/M2 | SYSTOLIC BLOOD PRESSURE: 112 MMHG | WEIGHT: 142 LBS | HEART RATE: 77 BPM | RESPIRATION RATE: 16 BRPM | HEIGHT: 59 IN | OXYGEN SATURATION: 99 % | TEMPERATURE: 98.7 F | DIASTOLIC BLOOD PRESSURE: 65 MMHG

## 2023-01-20 PROCEDURE — 99215 OFFICE O/P EST HI 40 MIN: CPT

## 2023-01-20 RX ORDER — FOLIC ACID 1 MG/1
1 TABLET ORAL
Qty: 30 | Refills: 5 | Status: ACTIVE | COMMUNITY
Start: 2017-10-06 | End: 1900-01-01

## 2023-01-20 NOTE — REASON FOR VISIT
[Follow-up] : a follow-up of an existing diagnosis [FreeTextEntry1] : Nonalcoholic cirrhosis without ascites

## 2023-01-20 NOTE — ASSESSMENT
[FreeTextEntry1] : Impression: Nonalcoholic cirrhosis without ascites with esophageal and gastric varices with last upper endoscopy being done in September 2022.\par \par Recommendations: Repeat upper endoscopy will be scheduled at [facility] for reevaluation of her previously seen esophageal and gastric varices.  She is to continue her current carvedilol and furosemide and spironolactone therapies.  She will be sent for repeat lab work as ordered below including CMP and CBC with differential and PT INR as well as repeat abdominal sonogram.  Her prescriptions were renewed today and she was accompanied by her son who speaks English and both the patient and her son appeared to understand all of the above instructions, information, and management plan.  She is to hold Eliquis 3 days prior to the above upper endoscopy which she takes for her hematologic disorder and not for cardiac issues.  Her ASA classification is 3 and pending the above lab work she will be medically optimized for the above upper endoscopy.

## 2023-01-20 NOTE — PHYSICAL EXAM
[Alert] : alert [Normal Voice/Communication] : normal voice/communication [Healthy Appearing] : healthy appearing [No Acute Distress] : no acute distress [Well Developed] : well developed [Well Nourished] : well nourished [Sclera] : the sclera and conjunctiva were normal [Hearing Threshold Finger Rub Not [place]] : hearing was normal [Normal Lips/Gums] : the lips and gums were normal [Oropharynx] : the oropharynx was normal [Normal Appearance] : the appearance of the neck was normal [No Neck Mass] : no neck mass was observed [No Respiratory Distress] : no respiratory distress [No Acc Muscle Use] : no accessory muscle use [Respiration, Rhythm And Depth] : normal respiratory rhythm and effort [Auscultation Breath Sounds / Voice Sounds] : lungs were clear to auscultation bilaterally [Heart Rate And Rhythm] : heart rate was normal and rhythm regular [Normal S1, S2] : normal S1 and S2 [Murmurs] : no murmurs [Bowel Sounds] : normal bowel sounds [Abdomen Tenderness] : non-tender [No Masses] : no abdominal mass palpated [Abdomen Soft] : soft [] : no hepatosplenomegaly [No CVA Tenderness] : no CVA  tenderness [Abnormal Walk] : normal gait [No Joint Swelling] : no joint swelling seen [Normal Color / Pigmentation] : normal skin color and pigmentation [Oriented To Time, Place, And Person] : oriented to person, place, and time [de-identified] : No clinically detectable ascites [de-identified] : Deferred at patient's request.

## 2023-01-20 NOTE — REVIEW OF SYSTEMS
[As Noted in HPI] : as noted in HPI [Vomiting] : vomiting [Negative] : Heme/Lymph [Abdominal Pain] : no abdominal pain [Constipation] : no constipation [Diarrhea] : no diarrhea [Heartburn] : no heartburn [Melena (black stool)] : no melena [Bleeding] : no bleeding [Fecal Incontinence (soiling)] : no fecal incontinence [Bloating (gassiness)] : no bloating

## 2023-01-20 NOTE — HISTORY OF PRESENT ILLNESS
[de-identified] : 9/22/2022.  1.43 cm x 1.365 cm cystic lesion in the head of the pancreas .  No FNA done. [de-identified] : 6/27/2022 head of pancreas cystic lesion and cirrhosis with hemangioma and gastroesophageal varices. [FreeTextEntry1] : 2017.  Head of pancreas cyst and cirrhosis. [de-identified] : 3/24/2021 head of pancreas cystic lesion and cirrhosis.  2018.  Cirrhosis.  2017.  Cirrhosis and ascites.

## 2023-01-23 LAB — CORE LAB BIOPSY: NORMAL

## 2023-01-24 ENCOUNTER — APPOINTMENT (OUTPATIENT)
Dept: GASTROENTEROLOGY | Facility: CLINIC | Age: 73
End: 2023-01-24

## 2023-01-24 ENCOUNTER — APPOINTMENT (OUTPATIENT)
Dept: CT IMAGING | Facility: CLINIC | Age: 73
End: 2023-01-24

## 2023-01-26 ENCOUNTER — OUTPATIENT (OUTPATIENT)
Dept: OUTPATIENT SERVICES | Facility: HOSPITAL | Age: 73
LOS: 1 days | End: 2023-01-26
Payer: MEDICARE

## 2023-01-26 ENCOUNTER — APPOINTMENT (OUTPATIENT)
Dept: ULTRASOUND IMAGING | Facility: CLINIC | Age: 73
End: 2023-01-26
Payer: MEDICARE

## 2023-01-26 DIAGNOSIS — Z98.890 OTHER SPECIFIED POSTPROCEDURAL STATES: Chronic | ICD-10-CM

## 2023-01-26 DIAGNOSIS — K74.60 UNSPECIFIED CIRRHOSIS OF LIVER: ICD-10-CM

## 2023-01-26 PROCEDURE — 76705 ECHO EXAM OF ABDOMEN: CPT | Mod: 26

## 2023-01-26 PROCEDURE — 76705 ECHO EXAM OF ABDOMEN: CPT

## 2023-03-16 ENCOUNTER — TRANSCRIPTION ENCOUNTER (OUTPATIENT)
Age: 73
End: 2023-03-16

## 2023-03-16 ENCOUNTER — OUTPATIENT (OUTPATIENT)
Dept: OUTPATIENT SERVICES | Facility: HOSPITAL | Age: 73
LOS: 1 days | End: 2023-03-16
Payer: MEDICARE

## 2023-03-16 ENCOUNTER — APPOINTMENT (OUTPATIENT)
Dept: GASTROENTEROLOGY | Facility: HOSPITAL | Age: 73
End: 2023-03-16

## 2023-03-16 ENCOUNTER — RESULT REVIEW (OUTPATIENT)
Age: 73
End: 2023-03-16

## 2023-03-16 DIAGNOSIS — K74.60 UNSPECIFIED CIRRHOSIS OF LIVER: ICD-10-CM

## 2023-03-16 DIAGNOSIS — R18.8 UNSPECIFIED CIRRHOSIS OF LIVER: ICD-10-CM

## 2023-03-16 DIAGNOSIS — Z98.890 OTHER SPECIFIED POSTPROCEDURAL STATES: Chronic | ICD-10-CM

## 2023-03-16 DIAGNOSIS — K31.7 POLYP OF STOMACH AND DUODENUM: ICD-10-CM

## 2023-03-16 PROCEDURE — 43239 EGD BIOPSY SINGLE/MULTIPLE: CPT

## 2023-03-16 PROCEDURE — 88342 IMHCHEM/IMCYTCHM 1ST ANTB: CPT

## 2023-03-16 PROCEDURE — 88342 IMHCHEM/IMCYTCHM 1ST ANTB: CPT | Mod: 26

## 2023-03-16 PROCEDURE — 88305 TISSUE EXAM BY PATHOLOGIST: CPT

## 2023-03-16 PROCEDURE — 88305 TISSUE EXAM BY PATHOLOGIST: CPT | Mod: 26

## 2023-03-16 NOTE — HISTORY OF PRESENT ILLNESS
[de-identified] : 9/22/2022.  Grade 1 esophageal varices with negative H. pylori biopsies.  8/3/2017.  Grade 3 esophageal varices and duodenal varix.  EGD 2013.  Grade 1 varices.  EGD 2013.  Grade 1 varices. [FreeTextEntry1] : 4/23/2021.  10 mm cecal polyp and rectal polyp and diverticulosis and internal hemorrhoids and rectal varices.  8/3/2017.  Colon polyps diverticulosis internal hemorrhoids and rectal varices. [de-identified] : 9/22/2022.  1.43 cm x 1.365 cm cystic lesion in the head of the pancreas .  No FNA done.

## 2023-03-16 NOTE — REVIEW OF SYSTEMS
[As Noted in HPI] : as noted in HPI [Vomiting] : vomiting [Negative] : Heme/Lymph [Abdominal Pain] : no abdominal pain [Constipation] : no constipation [Diarrhea] : no diarrhea [Heartburn] : no heartburn [Melena (black stool)] : no melena [Fecal Incontinence (soiling)] : no fecal incontinence [Swollen Glands] : no swollen glands

## 2023-03-16 NOTE — ASSESSMENT
[FreeTextEntry1] : Impression: Nonalcoholic cirrhosis without ascites with esophageal and gastric varices with last upper endoscopy being done in September 2022.\par \par Recommendations: Repeat upper endoscopy will be scheduled at [facility] for reevaluation of her previously seen esophageal and gastric varices.  She is to continue her current carvedilol and furosemide and spironolactone therapies.  She will be sent for repeat lab work as ordered below including CMP and CBC with differential and PT INR as well as repeat abdominal sonogram.  Her prescriptions were renewed today and she was accompanied by her son who speaks English and both the patient and her son appeared to understand all of the above instructions, information, and management plan.  She is to hold Eliquis 3 days prior to the above upper endoscopy which she takes for her hematologic disorder and not for cardiac issues.  Her ASA classification is 3 and pending the above lab work she will be medically optimized for the above upper endoscopy. She remains medically optimized for the proposed EGD.

## 2023-03-16 NOTE — PHYSICAL EXAM
[Alert] : alert [Normal Voice/Communication] : normal voice/communication [Healthy Appearing] : healthy appearing [No Acute Distress] : no acute distress [Well Developed] : well developed [Well Nourished] : well nourished [Sclera] : the sclera and conjunctiva were normal [Hearing Threshold Finger Rub Not [place]] : hearing was normal [Normal Lips/Gums] : the lips and gums were normal [Oropharynx] : the oropharynx was normal [No Neck Mass] : no neck mass was observed [Normal Appearance] : the appearance of the neck was normal [No Respiratory Distress] : no respiratory distress [No Acc Muscle Use] : no accessory muscle use [Respiration, Rhythm And Depth] : normal respiratory rhythm and effort [Auscultation Breath Sounds / Voice Sounds] : lungs were clear to auscultation bilaterally [Heart Rate And Rhythm] : heart rate was normal and rhythm regular [Normal S1, S2] : normal S1 and S2 [Murmurs] : no murmurs [Bowel Sounds] : normal bowel sounds [No Masses] : no abdominal mass palpated [Abdomen Tenderness] : non-tender [Abdomen Soft] : soft [No CVA Tenderness] : no CVA  tenderness [] : no hepatosplenomegaly [Abnormal Walk] : normal gait [No Joint Swelling] : no joint swelling seen [Normal Color / Pigmentation] : normal skin color and pigmentation [Oriented To Time, Place, And Person] : oriented to person, place, and time [de-identified] : No clinically detectable ascites [de-identified] : Deferred at patient's request.

## 2023-03-20 ENCOUNTER — APPOINTMENT (OUTPATIENT)
Dept: PULMONOLOGY | Facility: CLINIC | Age: 73
End: 2023-03-20

## 2023-03-22 LAB — SURGICAL PATHOLOGY STUDY: SIGNIFICANT CHANGE UP

## 2023-03-27 RX ORDER — FUROSEMIDE 40 MG/1
40 TABLET ORAL DAILY
Qty: 30 | Refills: 5 | Status: ACTIVE | COMMUNITY
Start: 1900-01-01 | End: 1900-01-01

## 2023-04-19 ENCOUNTER — OFFICE (OUTPATIENT)
Dept: URBAN - METROPOLITAN AREA CLINIC 94 | Facility: CLINIC | Age: 73
Setting detail: OPHTHALMOLOGY
End: 2023-04-19
Payer: MEDICARE

## 2023-04-19 DIAGNOSIS — H16.223: ICD-10-CM

## 2023-04-19 DIAGNOSIS — H35.033: ICD-10-CM

## 2023-04-19 DIAGNOSIS — Z96.1: ICD-10-CM

## 2023-04-19 DIAGNOSIS — H26.493: ICD-10-CM

## 2023-04-19 PROBLEM — H11.042 PTERYGIUM-PERIPHERAL; LEFT EYE: Status: ACTIVE | Noted: 2023-04-19

## 2023-04-19 PROCEDURE — 99213 OFFICE O/P EST LOW 20 MIN: CPT | Performed by: OPHTHALMOLOGY

## 2023-04-19 ASSESSMENT — REFRACTION_CURRENTRX
OD_VPRISM_DIRECTION: PROGS
OS_SPHERE: +0.75
OS_VPRISM_DIRECTION: PROGS
OD_SPHERE: +0.75
OS_ADD: +2.00
OS_CYLINDER: -0.25
OS_VPRISM_DIRECTION: PROGS
OD_CYLINDER: -1.25
OD_OVR_VA: 20/
OD_SPHERE: -0.50
OS_OVR_VA: 20/
OD_ADD: +2.00
OD_AXIS: 000
OS_OVR_VA: 20/
OS_SPHERE: +0.25
OS_AXIS: 146
OD_ADD: +2.25
OD_CYLINDER: 0.00
OD_AXIS: 118
OD_OVR_VA: 20/
OS_ADD: +2.50
OD_VPRISM_DIRECTION: PROGS
OS_AXIS: 071
OS_CYLINDER: -0.75

## 2023-04-19 ASSESSMENT — AXIALLENGTH_DERIVED
OS_AL: 23.0722
OS_AL: 23.1657
OD_AL: 22.9359
OD_AL: 22.982

## 2023-04-19 ASSESSMENT — KERATOMETRY
OD_K1POWER_DIOPTERS: 45.75
OD_AXISANGLE_DEGREES: 093
OD_K2POWER_DIOPTERS: 46.25
OS_K2POWER_DIOPTERS: 46.25
METHOD_AUTO_MANUAL: AUTO
OS_K1POWER_DIOPTERS: 45.50
OS_AXISANGLE_DEGREES: 098

## 2023-04-19 ASSESSMENT — CONFRONTATIONAL VISUAL FIELD TEST (CVF)
OD_FINDINGS: FULL
OS_FINDINGS: FULL

## 2023-04-19 ASSESSMENT — REFRACTION_MANIFEST
OD_CYLINDER: -0.50
OD_SPHERE: -0.50
OS_CYLINDER: -0.25
OS_AXIS: 58
OS_SPHERE: -1.00
OD_VA1: 20/20
OS_VA1: 20/20
OD_AXIS: 73

## 2023-04-19 ASSESSMENT — REFRACTION_AUTOREFRACTION
OD_AXIS: 003
OD_SPHERE: -0.50
OD_CYLINDER: -0.25
OS_SPHERE: -0.75
OS_CYLINDER: -0.25
OS_AXIS: 041

## 2023-04-19 ASSESSMENT — SPHEQUIV_DERIVED
OD_SPHEQUIV: -0.75
OS_SPHEQUIV: -1.125
OD_SPHEQUIV: -0.625
OS_SPHEQUIV: -0.875

## 2023-04-19 ASSESSMENT — VISUAL ACUITY
OS_BCVA: 20/20
OD_BCVA: 20/20

## 2023-04-19 ASSESSMENT — TONOMETRY
OS_IOP_MMHG: 11
OD_IOP_MMHG: 10

## 2023-04-19 ASSESSMENT — CORNEAL PTERYGIUM: OD_PTERYGIUM: NASAL

## 2023-04-19 ASSESSMENT — SUPERFICIAL PUNCTATE KERATITIS (SPK)
OD_SPK: 1+ 2+
OS_SPK: 1+ 2+

## 2023-05-07 ENCOUNTER — OFFICE (OUTPATIENT)
Dept: URBAN - METROPOLITAN AREA CLINIC 94 | Facility: CLINIC | Age: 73
Setting detail: OPHTHALMOLOGY
End: 2023-05-07
Payer: MEDICARE

## 2023-05-07 DIAGNOSIS — Z96.1: ICD-10-CM

## 2023-05-07 DIAGNOSIS — H35.033: ICD-10-CM

## 2023-05-07 DIAGNOSIS — M06.9: ICD-10-CM

## 2023-05-07 DIAGNOSIS — Z79.899: ICD-10-CM

## 2023-05-07 PROBLEM — H26.492 POSTERIOR CAPSULAR OPACIFICATION; LEFT EYE: Status: ACTIVE | Noted: 2023-05-07

## 2023-05-07 PROCEDURE — 92134 CPTRZ OPH DX IMG PST SGM RTA: CPT | Performed by: PHYSICIAN ASSISTANT

## 2023-05-07 PROCEDURE — 99213 OFFICE O/P EST LOW 20 MIN: CPT | Performed by: PHYSICIAN ASSISTANT

## 2023-05-07 ASSESSMENT — REFRACTION_CURRENTRX
OD_CYLINDER: -1.25
OD_ADD: +2.25
OS_CYLINDER: -0.75
OD_ADD: +2.00
OS_OVR_VA: 20/
OD_VPRISM_DIRECTION: PROGS
OD_AXIS: 000
OD_SPHERE: -0.50
OD_SPHERE: +0.75
OS_VPRISM_DIRECTION: PROGS
OD_AXIS: 118
OD_OVR_VA: 20/
OS_ADD: +2.50
OS_ADD: +2.00
OS_SPHERE: +0.75
OS_VPRISM_DIRECTION: PROGS
OD_OVR_VA: 20/
OS_SPHERE: +0.25
OS_OVR_VA: 20/
OS_AXIS: 146
OS_CYLINDER: -0.25
OD_CYLINDER: 0.00
OS_AXIS: 071
OD_VPRISM_DIRECTION: PROGS

## 2023-05-07 ASSESSMENT — REFRACTION_MANIFEST
OD_AXIS: 73
OS_CYLINDER: -0.25
OD_CYLINDER: -0.50
OS_AXIS: 58
OS_SPHERE: -1.00
OD_SPHERE: -0.50
OS_VA1: 20/20
OD_VA1: 20/20

## 2023-05-07 ASSESSMENT — CONFRONTATIONAL VISUAL FIELD TEST (CVF)
OD_FINDINGS: FULL
OS_FINDINGS: FULL

## 2023-05-07 ASSESSMENT — KERATOMETRY
OS_K1POWER_DIOPTERS: 45.50
OS_K2POWER_DIOPTERS: 46.25
METHOD_AUTO_MANUAL: AUTO
OD_AXISANGLE_DEGREES: 171
OD_K1POWER_DIOPTERS: 41.75
OS_AXISANGLE_DEGREES: 102
OD_K2POWER_DIOPTERS: 45.75

## 2023-05-07 ASSESSMENT — SPHEQUIV_DERIVED
OD_SPHEQUIV: -0.75
OD_SPHEQUIV: -0.875
OS_SPHEQUIV: -1.25
OS_SPHEQUIV: -1.125

## 2023-05-07 ASSESSMENT — AXIALLENGTH_DERIVED
OS_AL: 23.1657
OD_AL: 23.8436
OS_AL: 23.2127
OD_AL: 23.794

## 2023-05-07 ASSESSMENT — REFRACTION_AUTOREFRACTION
OS_SPHERE: -1.00
OD_CYLINDER: -0.25
OS_CYLINDER: -0.50
OD_SPHERE: -0.75
OS_AXIS: 020
OD_AXIS: 004

## 2023-05-07 ASSESSMENT — SUPERFICIAL PUNCTATE KERATITIS (SPK)
OS_SPK: 1+ 2+
OD_SPK: 1+ 2+

## 2023-05-07 ASSESSMENT — VISUAL ACUITY
OD_BCVA: 20/30
OS_BCVA: 20/20

## 2023-05-07 ASSESSMENT — TONOMETRY: OS_IOP_MMHG: 11

## 2023-05-07 ASSESSMENT — CORNEAL PTERYGIUM: OD_PTERYGIUM: NASAL

## 2023-05-11 ENCOUNTER — OFFICE (OUTPATIENT)
Dept: URBAN - METROPOLITAN AREA CLINIC 115 | Facility: CLINIC | Age: 73
Setting detail: OPHTHALMOLOGY
End: 2023-05-11

## 2023-05-11 ENCOUNTER — APPOINTMENT (OUTPATIENT)
Dept: GASTROENTEROLOGY | Facility: CLINIC | Age: 73
End: 2023-05-11
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
## 2023-05-11 PROCEDURE — NO SHOW FE NO SHOW FEE: Performed by: OPTOMETRIST

## 2023-06-09 ENCOUNTER — APPOINTMENT (OUTPATIENT)
Dept: GASTROENTEROLOGY | Facility: CLINIC | Age: 73
End: 2023-06-09
Payer: MEDICARE

## 2023-06-09 VITALS
RESPIRATION RATE: 14 BRPM | BODY MASS INDEX: 29.84 KG/M2 | WEIGHT: 148 LBS | HEIGHT: 59 IN | DIASTOLIC BLOOD PRESSURE: 61 MMHG | OXYGEN SATURATION: 98 % | HEART RATE: 66 BPM | TEMPERATURE: 98 F | SYSTOLIC BLOOD PRESSURE: 104 MMHG

## 2023-06-09 DIAGNOSIS — I85.00 ESOPHAGEAL VARICES W/OUT BLEEDING: ICD-10-CM

## 2023-06-09 DIAGNOSIS — I86.4 ESOPHAGEAL VARICES W/OUT BLEEDING: ICD-10-CM

## 2023-06-09 DIAGNOSIS — Z86.010 PERSONAL HISTORY OF COLONIC POLYPS: ICD-10-CM

## 2023-06-09 LAB
INR PPP: 1.92 RATIO
PT BLD: 22.8 SEC

## 2023-06-09 PROCEDURE — 99214 OFFICE O/P EST MOD 30 MIN: CPT

## 2023-06-09 NOTE — ASSESSMENT
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  Personal history of colon polyps status post colonoscopy with polypectomy in 2021.\par \par Recommendations: Repeat colonoscopy in 2026 for continued colon polyp surveillance.  Patient was sent for repeat lab work including CMP and CBC with differential and AFP and PT/INR and will be scheduled for repeat upper endoscopy for reevaluation of her esophageal and gastric varices which will be done at [facility] with the above lab work that was ordered.  Her ASA classification is 3 and pending the results of the above lab work she will be medically optimized for the proposed upper endoscopy and seemed to understand all of the above instructions, information, and management plan.  She was instructed to hold her Xarelto 3 days prior to the above upper endoscopy.

## 2023-06-09 NOTE — REASON FOR VISIT
[Follow-up] : a follow-up of an existing diagnosis [FreeTextEntry1] : Nonalcoholic cirrhosis and esophageal and gastric varices

## 2023-06-09 NOTE — HISTORY OF PRESENT ILLNESS
[de-identified] : 9/22/2022.  1.43 cm x 1.365 cm cystic lesion in the head of the pancreas .  No FNA done. [de-identified] : 6/27/2022 head of pancreas cystic lesion and cirrhosis with hemangioma and gastroesophageal varices. [FreeTextEntry1] : 2017.  Head of pancreas cyst and cirrhosis. [de-identified] : 3/24/2021 head of pancreas cystic lesion and cirrhosis.  2018.  Cirrhosis.  2017.  Cirrhosis and ascites.

## 2023-06-09 NOTE — PHYSICAL EXAM
[Alert] : alert [Normal Voice/Communication] : normal voice/communication [Healthy Appearing] : healthy appearing [No Acute Distress] : no acute distress [Well Developed] : well developed [Well Nourished] : well nourished [Sclera] : the sclera and conjunctiva were normal [Hearing Threshold Finger Rub Not [place]] : hearing was normal [Normal Lips/Gums] : the lips and gums were normal [Oropharynx] : the oropharynx was normal [Normal Appearance] : the appearance of the neck was normal [No Neck Mass] : no neck mass was observed [No Respiratory Distress] : no respiratory distress [No Acc Muscle Use] : no accessory muscle use [Respiration, Rhythm And Depth] : normal respiratory rhythm and effort [Auscultation Breath Sounds / Voice Sounds] : lungs were clear to auscultation bilaterally [Heart Rate And Rhythm] : heart rate was normal and rhythm regular [Normal S1, S2] : normal S1 and S2 [Murmurs] : no murmurs [None] : no edema [Bowel Sounds] : normal bowel sounds [Abdomen Tenderness] : non-tender [No Masses] : no abdominal mass palpated [Abdomen Soft] : soft [] : no hepatosplenomegaly [No CVA Tenderness] : no CVA  tenderness [Abnormal Walk] : normal gait [No Joint Swelling] : no joint swelling seen [Normal Color / Pigmentation] : normal skin color and pigmentation [Oriented To Time, Place, And Person] : oriented to person, place, and time [de-identified] : No clinically detectable ascites [de-identified] : Deferred at patient's request.

## 2023-06-10 LAB
AFP-TM SERPL-MCNC: 2.2 NG/ML
ALBUMIN SERPL ELPH-MCNC: 3.8 G/DL
ALP BLD-CCNC: 120 U/L
ALT SERPL-CCNC: 20 U/L
ANION GAP SERPL CALC-SCNC: 11 MMOL/L
AST SERPL-CCNC: 29 U/L
BILIRUB SERPL-MCNC: 0.6 MG/DL
BUN SERPL-MCNC: 12 MG/DL
CALCIUM SERPL-MCNC: 8.7 MG/DL
CHLORIDE SERPL-SCNC: 107 MMOL/L
CO2 SERPL-SCNC: 19 MMOL/L
CREAT SERPL-MCNC: 0.73 MG/DL
EGFR: 88 ML/MIN/1.73M2
GLUCOSE SERPL-MCNC: 98 MG/DL
POTASSIUM SERPL-SCNC: 4.7 MMOL/L
PROT SERPL-MCNC: 6.8 G/DL
SODIUM SERPL-SCNC: 137 MMOL/L

## 2023-06-17 ENCOUNTER — OUTPATIENT (OUTPATIENT)
Dept: OUTPATIENT SERVICES | Facility: HOSPITAL | Age: 73
LOS: 1 days | End: 2023-06-17
Payer: MEDICARE

## 2023-06-17 ENCOUNTER — APPOINTMENT (OUTPATIENT)
Dept: ULTRASOUND IMAGING | Facility: CLINIC | Age: 73
End: 2023-06-17
Payer: MEDICARE

## 2023-06-17 DIAGNOSIS — Z98.890 OTHER SPECIFIED POSTPROCEDURAL STATES: Chronic | ICD-10-CM

## 2023-06-17 DIAGNOSIS — K74.60 UNSPECIFIED CIRRHOSIS OF LIVER: ICD-10-CM

## 2023-06-17 DIAGNOSIS — Z00.8 ENCOUNTER FOR OTHER GENERAL EXAMINATION: ICD-10-CM

## 2023-06-17 PROCEDURE — 76700 US EXAM ABDOM COMPLETE: CPT | Mod: 26

## 2023-06-17 PROCEDURE — 76700 US EXAM ABDOM COMPLETE: CPT

## 2023-06-19 ENCOUNTER — APPOINTMENT (OUTPATIENT)
Dept: ORTHOPEDIC SURGERY | Facility: CLINIC | Age: 73
End: 2023-06-19

## 2023-08-14 ENCOUNTER — APPOINTMENT (OUTPATIENT)
Dept: ORTHOPEDIC SURGERY | Facility: CLINIC | Age: 73
End: 2023-08-14
Payer: MEDICARE

## 2023-08-14 VITALS
DIASTOLIC BLOOD PRESSURE: 59 MMHG | SYSTOLIC BLOOD PRESSURE: 93 MMHG | HEIGHT: 59 IN | WEIGHT: 147 LBS | HEART RATE: 65 BPM | BODY MASS INDEX: 29.64 KG/M2

## 2023-08-14 PROCEDURE — 73562 X-RAY EXAM OF KNEE 3: CPT | Mod: 50

## 2023-08-14 PROCEDURE — 99204 OFFICE O/P NEW MOD 45 MIN: CPT

## 2023-08-14 NOTE — DISCUSSION/SUMMARY
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 The nature of her condition and treatment options were discussed in detail. The pt is a candidate for a staged bilateral TKA. The surgery was discussed in detail. 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 She will need to see a hepatologist prior to surgery. We also discussed exhausting conservative therapy options. The pt deferred on treatment today. She will consider surgery and she will f/u with us and she will talk with the surgical coordinator to schedule her surgery. She will f/u in 6 months. All questions were answered.   The patient is a 73 year individual with end stage arthritis of their b/l knee joint. Based upon the patient's continued symptoms and failure to respond to conservative treatment (including HA injections, cortisone injections, over the counter medications, and PT) I have recommended a b/l total knee arthroplasty for this patient. A long discussion took place with the patient describing what a total joint replacement is and what the procedure would entail. A total knee arthroplasty model, similar to the implant that was used during the operation, was utilized to demonstrate and to discuss the various bearing surfaces of the implants. The hospitalization and post-operative care and rehabilitation were also discussed. The use of perioperative antibiotics and DVT prophylaxis were discussed. The risk, benefits and alternatives to a surgical intervention were discussed at length with the patient. The patient was also advised of risks related to the medical comorbidities, elevated body mass index (BMI), and smoking where applicable. We discussed how to reduce modifiable risk factors and encouraged smoking cessation were applicable.. A lengthy discussion took place to review the most common complications including but not limited to: deep vein thrombosis, pulmonary embolus, heart attack, stroke, infection, wound breakdown, numbness, damage to nerves, tendon, muscles, arteries or other blood vessels, death and other possible complications from anesthesia. The patient was told that we will take steps to minimize these risks by using sterile technique, antibiotics and DVT prophylaxis when appropriate and follow the patient postoperatively in the office setting to monitor progress. The possibility of recurrent pain, no improvement in pain and actual worsening of pain were also discussed with the patient. The discharge plan of care focused on the patient going home following surgery. The patient was encouraged to make the necessary arrangements to have someone stay with them when they are discharged home. Following discharge, a home care nurse was to the patient. The home care nurse would open the patients home care case and request home physical therapy services. Home physical therapy was to commence following discharge provided it was appropriate and covered by the health insurance benefit plan.  The benefits of surgery were discussed with the patient including the potential for improving her current clinical condition through operative intervention. Alternatives to surgical intervention including continued conservative management were also discussed in detail. All questions were answered to the satisfaction of the patient. The treatment plan of care, as well as a model of a total knee arthroplasty equivalent to the one that will be used for their total joint replacement, was shared with the patient. The patient agreed to the plan of care as well as the use of implants in their total joint replacement.

## 2023-08-14 NOTE — REASON FOR VISIT
[Initial Visit] : an initial visit for [Family Member] : family member [Ad Hoc ] : provided by an ad hoc  [FreeTextEntry2] : Bilateral Knee pain [Interpreters_IDNumber] : [id number] [Interpreters_FullName] : Mtaty [TWNoteComboBox1] : [place]

## 2023-08-14 NOTE — PHYSICAL EXAM
[LE] : Sensory: Intact in bilateral lower extremities [ALL] : dorsalis pedis, posterior tibial, femoral, popliteal, and radial 2+ and symmetric bilaterally [de-identified] : GENERAL APPEARANCE: Well nourished and hydrated, pleasant, alert, and oriented x 3. Appears their stated age.  HEENT: Normocephalic, extraocular eye motion intact. Nasal septum midline. Oral cavity clear. External auditory canal clear.  RESPIRATORY: Breath sounds clear and audible in all lobes. No wheezing, No accessory muscle use. CARDIOVASCULAR: No apparent abnormalities. No lower leg edema. No varicosities. Pedal pulses are palpable. NEUROLOGIC: Sensation is normal, no muscle weakness in the upper or lower extremities. DERMATOLOGIC: No apparent skin lesions, moist, warm, no rash. SPINE: Cervical spine appears normal and moves freely; thoracic spine appears normal and moves freely; lumbosacral spine appears normal and moves freely, normal, nontender. MUSCULOSKELETAL: Hands, wrists, and elbows are normal and move freely, shoulders are normal and move freely.  [de-identified] : Bilateral knee exam shows medial jointline tenderness, ROM 0-120 [de-identified] : 5V xray of the b/l knee done in the office today and reviewed by Dr. Chance Mistry demonstrates bone on bone medial compartmental osteoarthritis of both knees.

## 2023-08-14 NOTE — HISTORY OF PRESENT ILLNESS
[Worsening] : worsening [6] : a current pain level of 6/10 [3] : a minimum pain level of 3/10 [8] : a maximum pain level of 8/10 [Bending] : worsened by bending [Walking] : worsened by walking [Rest] : relieved by rest [de-identified] : 72 y/o F pt presents with bilateral knee pain. The pt has been having pain for many years. She denies any injury or trauma. She feels discomfort where she feels she is unable to properly navigate the stairs. She feels weakness, especially with walking. She feels better with rest. She has received cortisone and HA injections for her knees in the past which helps. However, she feels that they are decreasingly helpful. The pt has a prior hx of a blood clots, Lupus, sorosis, and RA.

## 2023-09-19 ENCOUNTER — APPOINTMENT (OUTPATIENT)
Dept: GASTROENTEROLOGY | Facility: HOSPITAL | Age: 73
End: 2023-09-19

## 2023-10-17 ENCOUNTER — NON-APPOINTMENT (OUTPATIENT)
Age: 73
End: 2023-10-17

## 2023-12-12 ENCOUNTER — APPOINTMENT (OUTPATIENT)
Dept: OBGYN | Facility: CLINIC | Age: 73
End: 2023-12-12
Payer: MEDICARE

## 2023-12-12 ENCOUNTER — LABORATORY RESULT (OUTPATIENT)
Age: 73
End: 2023-12-12

## 2023-12-12 VITALS
BODY MASS INDEX: 30.04 KG/M2 | HEIGHT: 59 IN | DIASTOLIC BLOOD PRESSURE: 66 MMHG | SYSTOLIC BLOOD PRESSURE: 114 MMHG | WEIGHT: 149 LBS

## 2023-12-12 DIAGNOSIS — Z12.31 ENCOUNTER FOR SCREENING MAMMOGRAM FOR MALIGNANT NEOPLASM OF BREAST: ICD-10-CM

## 2023-12-12 DIAGNOSIS — R87.810 CERVICAL HIGH RISK HUMAN PAPILLOMAVIRUS (HPV) DNA TEST POSITIVE: ICD-10-CM

## 2023-12-12 PROCEDURE — 99397 PER PM REEVAL EST PAT 65+ YR: CPT

## 2023-12-12 RX ORDER — FLUTICASONE PROPIONATE 50 UG/1
50 SPRAY, METERED NASAL
Qty: 16 | Refills: 0 | Status: DISCONTINUED | COMMUNITY
Start: 2022-04-02 | End: 2023-12-12

## 2023-12-14 NOTE — PLAN
[FreeTextEntry1] : Pap done today.  Prescription for mammogram screening given.  Self-breast exam reviewed.  She will follow up annually and as needed.

## 2023-12-14 NOTE — HISTORY OF PRESENT ILLNESS
[FreeTextEntry1] : ALESHIA 72 yo  LMP  is here today for an annual exam. No gynecological complaints.   Last mammogram on 23 revealed BR1.   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   Last colonoscopy was on 21 as per patient.   [Mammogramdate] : 04/01/2023 [TextBox_19] : BR1 [PapSmeardate] : 11/28/2022 [TextBox_31] : LSIL [BoneDensityDate] : 08/14/2019 [TextBox_37] : Osteoporosis  [ColonoscopyDate] : 04/23/2021 [HPVDate] : 11/28/2022 [TextBox_78] : Positive [PGHxTotal] : 3 [[facility]] : 2 [PGHxAbortions] : 1 [[facility]xLiving] : 2

## 2023-12-14 NOTE — END OF VISIT
[FreeTextEntry3] : I, Blair Thompson solely acted as scribe for Dr. Desi Mondragon on 12/12/2023  All medical entries made by the Scribe were at my, Dr. Wyatt, direction and personally dictated by me on 12/12/2023 . I have reviewed the chart and agree that the record accurately reflects my personal performance of the history, physical exam, assessment and plan. I have also personally directed, reviewed, and agreed with the chart.

## 2023-12-18 ENCOUNTER — TRANSCRIPTION ENCOUNTER (OUTPATIENT)
Age: 73
End: 2023-12-18

## 2023-12-19 ENCOUNTER — OUTPATIENT (OUTPATIENT)
Dept: OUTPATIENT SERVICES | Facility: HOSPITAL | Age: 73
LOS: 1 days | End: 2023-12-19
Payer: MEDICARE

## 2023-12-19 ENCOUNTER — RESULT REVIEW (OUTPATIENT)
Age: 73
End: 2023-12-19

## 2023-12-19 ENCOUNTER — APPOINTMENT (OUTPATIENT)
Dept: GASTROENTEROLOGY | Facility: HOSPITAL | Age: 73
End: 2023-12-19

## 2023-12-19 DIAGNOSIS — K76.6 PORTAL HYPERTENSION: ICD-10-CM

## 2023-12-19 DIAGNOSIS — K74.60 UNSPECIFIED CIRRHOSIS OF LIVER: ICD-10-CM

## 2023-12-19 DIAGNOSIS — K31.89 PORTAL HYPERTENSION: ICD-10-CM

## 2023-12-19 DIAGNOSIS — I86.4 GASTRIC VARICES: ICD-10-CM

## 2023-12-19 DIAGNOSIS — Z98.890 OTHER SPECIFIED POSTPROCEDURAL STATES: Chronic | ICD-10-CM

## 2023-12-19 DIAGNOSIS — R11.0 NAUSEA: ICD-10-CM

## 2023-12-19 DIAGNOSIS — I85.00 ESOPHAGEAL VARICES WITHOUT BLEEDING: ICD-10-CM

## 2023-12-19 PROCEDURE — 43244 EGD VARICES LIGATION: CPT | Mod: 59

## 2023-12-19 PROCEDURE — 88342 IMHCHEM/IMCYTCHM 1ST ANTB: CPT

## 2023-12-19 PROCEDURE — C1889: CPT

## 2023-12-19 PROCEDURE — 88342 IMHCHEM/IMCYTCHM 1ST ANTB: CPT | Mod: 26

## 2023-12-19 PROCEDURE — 43239 EGD BIOPSY SINGLE/MULTIPLE: CPT

## 2023-12-19 PROCEDURE — 43244 EGD VARICES LIGATION: CPT

## 2023-12-19 PROCEDURE — 88305 TISSUE EXAM BY PATHOLOGIST: CPT

## 2023-12-19 PROCEDURE — 88305 TISSUE EXAM BY PATHOLOGIST: CPT | Mod: 26

## 2023-12-19 DEVICE — SPEEDBAND: Type: IMPLANTABLE DEVICE | Status: FUNCTIONAL

## 2023-12-19 RX ORDER — SUCRALFATE 1 G/10ML
1 SUSPENSION ORAL 4 TIMES DAILY
Qty: 1200 | Refills: 5 | Status: ACTIVE | COMMUNITY
Start: 2023-12-19 | End: 1900-01-01

## 2023-12-19 NOTE — PHYSICAL EXAM
[Alert] : alert [Normal Voice/Communication] : normal voice/communication [Healthy Appearing] : healthy appearing [No Acute Distress] : no acute distress [Well Developed] : well developed [Well Nourished] : well nourished [Sclera] : the sclera and conjunctiva were normal [Hearing Threshold Finger Rub Not [place]] : hearing was normal [Normal Lips/Gums] : the lips and gums were normal [Oropharynx] : the oropharynx was normal [Normal Appearance] : the appearance of the neck was normal [No Neck Mass] : no neck mass was observed [No Respiratory Distress] : no respiratory distress [No Acc Muscle Use] : no accessory muscle use [Auscultation Breath Sounds / Voice Sounds] : lungs were clear to auscultation bilaterally [Respiration, Rhythm And Depth] : normal respiratory rhythm and effort [Heart Rate And Rhythm] : heart rate was normal and rhythm regular [Normal S1, S2] : normal S1 and S2 [Murmurs] : no murmurs [None] : no edema [Bowel Sounds] : normal bowel sounds [Abdomen Tenderness] : non-tender [No Masses] : no abdominal mass palpated [Abdomen Soft] : soft [] : no hepatosplenomegaly [No CVA Tenderness] : no CVA  tenderness [Abnormal Walk] : normal gait [No Joint Swelling] : no joint swelling seen [Normal Color / Pigmentation] : normal skin color and pigmentation [Oriented To Time, Place, And Person] : oriented to person, place, and time [de-identified] : No clinically detectable ascites [de-identified] : Deferred at patient's request.

## 2023-12-19 NOTE — HISTORY OF PRESENT ILLNESS
[de-identified] : 9/22/2022.  1.43 cm x 1.365 cm cystic lesion in the head of the pancreas .  No FNA done. [de-identified] : 6/27/2022 head of pancreas cystic lesion and cirrhosis with hemangioma and gastroesophageal varices. [FreeTextEntry1] : 2017.  Head of pancreas cyst and cirrhosis. [de-identified] : 3/24/2021 head of pancreas cystic lesion and cirrhosis.  2018.  Cirrhosis.  2017.  Cirrhosis and ascites.

## 2023-12-22 ENCOUNTER — NON-APPOINTMENT (OUTPATIENT)
Age: 73
End: 2023-12-22

## 2023-12-22 LAB
SURGICAL PATHOLOGY STUDY: SIGNIFICANT CHANGE UP
SURGICAL PATHOLOGY STUDY: SIGNIFICANT CHANGE UP

## 2023-12-28 ENCOUNTER — NON-APPOINTMENT (OUTPATIENT)
Age: 73
End: 2023-12-28

## 2023-12-29 LAB — HPV HIGH+LOW RISK DNA PNL CVX: DETECTED

## 2023-12-30 LAB — CYTOLOGY CVX/VAG DOC THIN PREP: ABNORMAL

## 2024-01-03 ENCOUNTER — RX RENEWAL (OUTPATIENT)
Age: 74
End: 2024-01-03

## 2024-01-20 ENCOUNTER — NON-APPOINTMENT (OUTPATIENT)
Age: 74
End: 2024-01-20

## 2024-01-25 ENCOUNTER — NON-APPOINTMENT (OUTPATIENT)
Age: 74
End: 2024-01-25

## 2024-01-31 ENCOUNTER — APPOINTMENT (OUTPATIENT)
Dept: OBGYN | Facility: CLINIC | Age: 74
End: 2024-01-31

## 2024-02-05 ENCOUNTER — APPOINTMENT (OUTPATIENT)
Dept: OBGYN | Facility: CLINIC | Age: 74
End: 2024-02-05
Payer: MEDICARE

## 2024-02-05 VITALS
DIASTOLIC BLOOD PRESSURE: 58 MMHG | WEIGHT: 150.25 LBS | BODY MASS INDEX: 30.29 KG/M2 | SYSTOLIC BLOOD PRESSURE: 108 MMHG | HEIGHT: 59 IN

## 2024-02-05 DIAGNOSIS — N87.0 MILD CERVICAL DYSPLASIA: ICD-10-CM

## 2024-02-05 DIAGNOSIS — B97.7 PAPILLOMAVIRUS AS THE CAUSE OF DISEASES CLASSIFIED ELSEWHERE: ICD-10-CM

## 2024-02-05 PROCEDURE — 57454 BX/CURETT OF CERVIX W/SCOPE: CPT

## 2024-02-05 RX ORDER — CARVEDILOL 6.25 MG/1
6.25 TABLET, FILM COATED ORAL
Qty: 45 | Refills: 3 | Status: DISCONTINUED | COMMUNITY
Start: 2022-07-25 | End: 2024-02-05

## 2024-02-05 RX ORDER — POTASSIUM CHLORIDE 750 MG/1
10 TABLET, FILM COATED, EXTENDED RELEASE ORAL
Qty: 90 | Refills: 0 | Status: DISCONTINUED | COMMUNITY
Start: 2022-04-06 | End: 2024-02-05

## 2024-02-05 NOTE — PLAN
[FreeTextEntry1] : Colposcopy was done today. Cervical biopsy at 12 o'clock. ECC performed. Patient tolerated the procedure well with no complications. Post-procedure instructions were given. Patient expressed understanding. All questions were answered.  F/u pap in one year or as needed.

## 2024-02-05 NOTE — PROCEDURE
[Colposcopy] : Colposcopy  [Consent Obtained] : Consent obtained [Risks] : risks [Benefits] : benefits [Patient] : patient [Infection] : infection [Bleeding] : bleeding [Allergic Reaction] : allergic reaction [LGSIL] : LGSIL [Colposcopy Adequate] : colposcopy adequate [SCI Fully Visualized] : SCI fully visualized [ECC Performed] : ECC performed [Biopsy] : biopsy taken [Hemostasis Obtained] : Hemostasis obtained [Tolerated Well] : the patient tolerated the procedure well [de-identified] : HPV positive [de-identified] : 1 [de-identified] : 12 o'clock [de-identified] : With silver nitrate.  [de-identified] : Will call with colpo results.  Repeat pap in one year.

## 2024-02-05 NOTE — HISTORY OF PRESENT ILLNESS
[LMP unknown] : LMP unknown [N] : Patient is not sexually active [Y] : Positive pregnancy history [unknown] : Patient is unsure of the date of her LMP [Menarche Age: ____] : age at menarche was [unfilled] [No] : Patient does not have concerns regarding sex [Previously active] : previously active [FreeTextEntry1] : ALESHIA 72 yo  LMP  is here today for a colposcopy procedure secondary to an LSIL, HPV positive pap smear.  [Mammogramdate] : 04/01/2023 [TextBox_19] : BR1 [PapSmeardate] : 12/12/2023 [TextBox_31] : DETECTED [BoneDensityDate] : 08/14/2019 [TextBox_37] : OSTEOPOROSIS [ColonoscopyDate] : 01/2024 [TextBox_43] : WNL PER PT  [HPVDate] : 12/12/2023 [TextBox_78] : DETECTED [PGHxTotal] : 3 [[facility]] : 2 [PGHxAbortions] : 1 [[facility]Living] : 2

## 2024-02-05 NOTE — PHYSICAL EXAM
[Appropriately responsive] : appropriately responsive [Alert] : alert [No Acute Distress] : no acute distress [Oriented x3] : oriented x3 [Labia Majora] : normal [Labia Minora] : normal [Atrophy] : atrophy [Dry Mucosa] : dry mucosa [No Bleeding] : There was no active vaginal bleeding [Normal] : normal [Uterine Adnexae] : normal [FreeTextEntry5] : Cervix practically flushed with vagina.

## 2024-02-05 NOTE — END OF VISIT
[FreeTextEntry3] : I, Blair Thompson solely acted as scribe for Dr. Desi Mondragon on 02/05/2024  All medical entries made by the Scribe were at my, Dr. Wyatt, direction and personally dictated by me on 02/05/2024 . I have reviewed the chart and agree that the record accurately reflects my personal performance of the history, physical exam, assessment and plan. I have also personally directed, reviewed, and agreed with the chart.

## 2024-02-10 ENCOUNTER — APPOINTMENT (OUTPATIENT)
Dept: OBGYN | Facility: CLINIC | Age: 74
End: 2024-02-10
Payer: MEDICARE

## 2024-02-10 VITALS
SYSTOLIC BLOOD PRESSURE: 112 MMHG | HEIGHT: 59 IN | BODY MASS INDEX: 30.24 KG/M2 | DIASTOLIC BLOOD PRESSURE: 62 MMHG | WEIGHT: 150 LBS

## 2024-02-10 DIAGNOSIS — D64.9 ANEMIA, UNSPECIFIED: ICD-10-CM

## 2024-02-10 DIAGNOSIS — N93.9 ABNORMAL UTERINE AND VAGINAL BLEEDING, UNSPECIFIED: ICD-10-CM

## 2024-02-10 DIAGNOSIS — R79.1 ABNORMAL COAGULATION PROFILE: ICD-10-CM

## 2024-02-10 DIAGNOSIS — Z01.419 ENCOUNTER FOR GYNECOLOGICAL EXAMINATION (GENERAL) (ROUTINE) W/OUT ABNORMAL FINDINGS: ICD-10-CM

## 2024-02-10 DIAGNOSIS — R87.612 LOW GRADE SQUAMOUS INTRAEPITHELIAL LESION ON CYTOLOGIC SMEAR OF CERVIX (LGSIL): ICD-10-CM

## 2024-02-10 PROCEDURE — 99214 OFFICE O/P EST MOD 30 MIN: CPT

## 2024-02-10 RX ORDER — NADOLOL 40 MG/1
40 TABLET ORAL
Qty: 30 | Refills: 5 | Status: DISCONTINUED | COMMUNITY
Start: 2023-12-19 | End: 2024-02-10

## 2024-02-10 NOTE — PHYSICAL EXAM
[Alert] : alert [Appropriately responsive] : appropriately responsive [No Acute Distress] : no acute distress [Oriented x3] : oriented x3 [Labia Majora] : normal [Labia Minora] : normal [Atrophy] : atrophy [Normal] : normal [Dry Mucosa] : dry mucosa [FreeTextEntry5] : No active bleeding from prior colposcopy biopsy site. Monsles applied for prophylaxis.

## 2024-02-10 NOTE — PLAN
[FreeTextEntry1] : Colposcopy pathology still pending. However, bleeding was likely from biopsy site given elevated PT/INR from xarelto.  She has no active bleeding on exam. Monsels applied in the vagina for prophylaxis.   In terms of her hemoglobin from the ER, I compared it with prior hemoglobin's. This has been stable since approx June 2022. She will F/U with her hematologist as previously scheduled.  Strict vaginal bleeding precautions reviewed.  Pt very appreciative of us today.

## 2024-02-10 NOTE — HISTORY OF PRESENT ILLNESS
[N] : Patient does not use contraception [LMP unknown] : LMP unknown [postmenopausal] : postmenopausal [Y] : Positive pregnancy history [unknown] : Patient is unsure of the date of her LMP [No] : Patient does not have concerns regarding sex [Menarche Age: ____] : age at menarche was [unfilled] [Previously active] : previously active [TextBox_19] : BR1 [FreeTextEntry1] :  72 yo here as a walk in secondary to bleeding and for a F/U from ER.  She had a colposcopy done with me on 2/5/24. She had minimal bleeding on the day of the procedure but last night she started to bleed more heavily. She stated that they did blood work and sono but they did not have to do anything else. Today she is barely bleeding.  She presented to [facility] ER.  Hb/HCT 9.4/30.0 PT 16.2 (elevated) INR 1.6 (elevated) [Mammogramdate] : 04/01/2023 [TextBox_31] : LSIL [BoneDensityDate] : 08/14/2019 [PapSmeardate] : 12/12/2023 [HPVDate] : 12/12/2023 [ColonoscopyDate] : 08/03/2017 [TextBox_78] : DETECTED.  [TextBox_37] : osteoporosis [[facility]] : 2 [PGHxTotal] : 3 [[facility]] : 2 [PGHxAbortions] : 1

## 2024-02-12 ENCOUNTER — APPOINTMENT (OUTPATIENT)
Dept: ORTHOPEDIC SURGERY | Facility: CLINIC | Age: 74
End: 2024-02-12

## 2024-02-13 LAB — CORE LAB BIOPSY: NORMAL

## 2024-02-14 ENCOUNTER — NON-APPOINTMENT (OUTPATIENT)
Age: 74
End: 2024-02-14

## 2024-02-16 ENCOUNTER — APPOINTMENT (OUTPATIENT)
Dept: OBGYN | Facility: CLINIC | Age: 74
End: 2024-02-16

## 2024-02-16 VITALS
HEIGHT: 59 IN | SYSTOLIC BLOOD PRESSURE: 110 MMHG | BODY MASS INDEX: 30.24 KG/M2 | DIASTOLIC BLOOD PRESSURE: 60 MMHG | WEIGHT: 150 LBS

## 2024-02-25 NOTE — REASON FOR VISIT
[Follow-Up] : a follow-up evaluation of [FreeTextEntry2] : CERVICAL CHECK AFTER BLEEDING FROM COLPOSCOPY

## 2024-02-25 NOTE — HISTORY OF PRESENT ILLNESS
[LMP unknown] : LMP unknown [postmenopausal] : postmenopausal [N] : Patient is not sexually active [Y] : Positive pregnancy history [unknown] : Patient is unsure of the date of her LMP [Menarche Age: ____] : age at menarche was [unfilled] [No] : Patient does not have concerns regarding sex [Previously active] : previously active [FreeTextEntry1] : Not seen. [TextBox_19] : BR1 [Mammogramdate] : 04/01/2023 [TextBox_31] : LSIL [PapSmeardate] : 12/12/2023 [BoneDensityDate] : 08/14/2019 [TextBox_37] : OSTEOPOROSIS [HPVDate] : 12/12/2023 [TextBox_78] : NEGATIVE FOR 16,18/45 POSITIVE FOR JAY [PGHxTotal] : 3 [[facility]Full[facility]] : 2 [PGHxAbortions] : 1 [[facility]] : 2

## 2024-04-15 ENCOUNTER — APPOINTMENT (OUTPATIENT)
Dept: ORTHOPEDIC SURGERY | Facility: CLINIC | Age: 74
End: 2024-04-15
Payer: MEDICARE

## 2024-04-15 VITALS
SYSTOLIC BLOOD PRESSURE: 114 MMHG | WEIGHT: 150 LBS | DIASTOLIC BLOOD PRESSURE: 74 MMHG | HEART RATE: 81 BPM | HEIGHT: 59 IN | BODY MASS INDEX: 30.24 KG/M2

## 2024-04-15 DIAGNOSIS — M17.11 UNILATERAL PRIMARY OSTEOARTHRITIS, RIGHT KNEE: ICD-10-CM

## 2024-04-15 DIAGNOSIS — M17.12 UNILATERAL PRIMARY OSTEOARTHRITIS, LEFT KNEE: ICD-10-CM

## 2024-04-15 PROCEDURE — G2211 COMPLEX E/M VISIT ADD ON: CPT

## 2024-04-15 PROCEDURE — 99214 OFFICE O/P EST MOD 30 MIN: CPT

## 2024-04-15 NOTE — PHYSICAL EXAM
[LE] : Sensory: Intact in bilateral lower extremities [ALL] : dorsalis pedis, posterior tibial, femoral, popliteal, and radial 2+ and symmetric bilaterally [Antalgic] : antalgic [de-identified] : GENERAL APPEARANCE: Well nourished and hydrated, pleasant, alert, and oriented x 3. Appears their stated age.  HEENT: Normocephalic, extraocular eye motion intact. Nasal septum midline. Oral cavity clear. External auditory canal clear.  RESPIRATORY: Breath sounds clear and audible in all lobes. No wheezing, No accessory muscle use. CARDIOVASCULAR: No apparent abnormalities. No lower leg edema. No varicosities. Pedal pulses are palpable. NEUROLOGIC: Sensation is normal, no muscle weakness in the upper or lower extremities. DERMATOLOGIC: No apparent skin lesions, moist, warm, no rash. SPINE: Cervical spine appears normal and moves freely; thoracic spine appears normal and moves freely; lumbosacral spine appears normal and moves freely, normal, nontender. MUSCULOSKELETAL: Hands, wrists, and elbows are normal and move freely, shoulders are normal and move freely.  [de-identified] : Bilateral knee exam shows medial jointline tenderness, ROM 0-120 + varus deformity

## 2024-04-15 NOTE — DISCUSSION/SUMMARY
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 The nature of her condition and treatment options were discussed in detail. The pt is a candidate for a staged bilateral TKA, starting with the left knee. The surgery was discussed in detail. 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 She will need to see a hepatologist prior to surgery. The pt has exhausted at least 3 months of conservative treatment such as low impact exercise, cortisone injections, HA injections, PT, and anti-inflammatories. The pt is losing her quality of life and is having difficulty with navigating stairs and walking. . The pt will talk with the surgical coordinator to schedule her surgery.  All questions were answered.   The patient is a 73 year year old individual with end stage arthritis of their left knee joint. The patient has exhausted a minimum of 3 months conservative treatment including prior injections (cortisone and/or hyaluronic acid injections), physical therapy, over the counter NSAIDS and pain medication where indicated.  In addition, the patient's quality of life is diminished due to significant chronic pain. The patient is having difficulty with activities of daily living, including ambulating, descending stairs, and rising from a seated position. Based upon the patients continued symptoms and failure to respond to conservative treatment, I have recommended a left total knee replacement for this patient. A long discussion took place with the patient describing what a total joint replacement is and what the procedure would entail. A knee model, similar to the implant that will be used during the operation, was utilized to demonstrate and to discuss the various bearing surfaces of the implants. Implant fixation, use of cement, was also discussed with the patient. Choices of implant manufacturers, mainly [employer] and [employer], were discussed and reviewed with preference to be made by patient and surgeon prior to operation. Final selection to be based on customary practice as well as preoperative templating with selection confirmed intraoperatively based on the patient's anatomy. The patient participated and agreed with the decision-making process. The hospitalization and post-operative care and rehabilitation were also discussed. The use of perioperative antibiotics and DVT prophylaxis were discussed. The risk, benefits and alternatives to a surgical intervention were discussed at length with the patient. The patient was also advised of risks related to the medical comorbidities and elevated body mass index (BMI). A lengthy discussion took place to review the most common complications including but not limited to: deep vein thrombosis, pulmonary embolism, heart attack, stroke, infection, wound breakdown, numbness, damage to nerves, tendon, muscles, arteries or other blood vessels, death and other possible complications from anesthesia. The patient was told that we will take steps to minimize these risks by using sterile technique, antibiotics and DVT prophylaxis when appropriate and follow the patient postoperatively in the office setting to monitor progress. The possibility of recurrent pain, no improvement in pain and actual worsening of pain were also discussed with the patient.  The discharge plan of care focused on the patient going home following surgery. The patient was encouraged to make the necessary arrangements to have someone stay with them when they are discharged home. Following discharge, a home care nurse will visit the patient. The home care nurse will open your home care case and request home physical therapy services. Home physical therapy will commence following discharge provided it is appropriate and covered by the health insurance benefit plan.  The benefits of surgery were discussed with the patient including the potential for improving his/her current clinical condition through operative intervention. Alternatives to surgical intervention including continued conservative management were also discussed in detail. All questions were answered to the satisfaction of the patient. The treatment plan of care, as well as a model of a total knee equivalent to the one that will be used for their total joint replacement, was shared with the patient. The patient participated and agreed to the plan of care as well as the use of the recommended implants for their total joint replacement surgery.

## 2024-04-15 NOTE — HISTORY OF PRESENT ILLNESS
[Worsening] : worsening [6] : a current pain level of 6/10 [3] : a minimum pain level of 3/10 [8] : a maximum pain level of 8/10 [Bending] : worsened by bending [Walking] : worsened by walking [Rest] : relieved by rest [de-identified] : 72 y/o F pt presents with bilateral knee pain. , left worse then right.  The pt was last seen August 2023. The pt has a hx of bone on bone medial compartmental .oa of both knees. She has been having pain for many years. The pt denies any major trauma or injury. The pt is having difficulty with nagivating stairs and walking. She c/o weakness. The pt feels better with rest. She has received cortisone and HA injections for her knees in the past which helps.  patient had years of conservative management with anti-inflammatories activity modification, physical therapy as well However, she feels  nothing is helping her anymore she is limping . The pt is here to  discuss surgical treatment . The pt has a prior hx of a blood clots, Lupus, sorosis, and RA.

## 2024-04-18 ENCOUNTER — RX RENEWAL (OUTPATIENT)
Age: 74
End: 2024-04-18

## 2024-04-18 RX ORDER — ONDANSETRON 4 MG/1
4 TABLET, ORALLY DISINTEGRATING ORAL 4 TIMES DAILY
Qty: 120 | Refills: 5 | Status: ACTIVE | COMMUNITY
Start: 2023-12-19 | End: 1900-01-01

## 2024-05-03 ENCOUNTER — APPOINTMENT (OUTPATIENT)
Dept: GASTROENTEROLOGY | Facility: CLINIC | Age: 74
End: 2024-05-03
Payer: MEDICARE

## 2024-05-03 VITALS
HEIGHT: 59 IN | HEART RATE: 85 BPM | RESPIRATION RATE: 14 BRPM | WEIGHT: 153 LBS | DIASTOLIC BLOOD PRESSURE: 71 MMHG | TEMPERATURE: 97.1 F | OXYGEN SATURATION: 93 % | SYSTOLIC BLOOD PRESSURE: 101 MMHG | BODY MASS INDEX: 30.84 KG/M2

## 2024-05-03 DIAGNOSIS — G89.29 EPIGASTRIC PAIN: ICD-10-CM

## 2024-05-03 DIAGNOSIS — R10.13 EPIGASTRIC PAIN: ICD-10-CM

## 2024-05-03 DIAGNOSIS — K59.00 CONSTIPATION, UNSPECIFIED: ICD-10-CM

## 2024-05-03 DIAGNOSIS — I85.00 ESOPHAGEAL VARICES W/OUT BLEEDING: ICD-10-CM

## 2024-05-03 DIAGNOSIS — Z09 ENCOUNTER FOR FOLLOW-UP EXAMINATION AFTER COMPLETED TREATMENT FOR CONDITIONS OTHER THAN MALIGNANT NEOPLASM: ICD-10-CM

## 2024-05-03 DIAGNOSIS — K74.60 UNSPECIFIED CIRRHOSIS OF LIVER: ICD-10-CM

## 2024-05-03 DIAGNOSIS — I85.10 UNSPECIFIED CIRRHOSIS OF LIVER: ICD-10-CM

## 2024-05-03 DIAGNOSIS — R10.84 GENERALIZED ABDOMINAL PAIN: ICD-10-CM

## 2024-05-03 PROCEDURE — 99214 OFFICE O/P EST MOD 30 MIN: CPT

## 2024-05-03 RX ORDER — LEVOTHYROXINE SODIUM 0.11 MG/1
112 TABLET ORAL
Qty: 90 | Refills: 0 | Status: DISCONTINUED | COMMUNITY
Start: 2022-07-26 | End: 2024-05-03

## 2024-05-03 RX ORDER — LACTULOSE 10 G/15ML
10 SOLUTION ORAL DAILY
Qty: 1 | Refills: 1 | Status: ACTIVE | COMMUNITY
Start: 2024-05-03 | End: 1900-01-01

## 2024-05-03 RX ORDER — RIVAROXABAN 15 MG/1
15 TABLET, FILM COATED ORAL
Refills: 0 | Status: DISCONTINUED | COMMUNITY
End: 2024-05-03

## 2024-05-03 NOTE — ADDENDUM
[FreeTextEntry1] : I, Sondra Spencer NP, acted as scribe for Dr. MARIIA Honeycutt for this patient encounter.   Spoke to patient's son Curly, he reports cardiologist discontinued carvedilol about 3-4 months ago, because patient was hypotensive.

## 2024-05-03 NOTE — ASSESSMENT
[FreeTextEntry1] : Will schedule for EGD at [facility] with Dr. Moser to assess varices s/p banding Will order lactulose for constipation   The indications, benefits, risks of EGD were discussed with the patient. The patient understands and has agreed to have an endoscopy and is medically optimized for the planned procedure.   I evaluated this patient with my ACP with the above assessment and management plan for repeat upper endoscopy for continued evaluation of large esophageal varices that were banded in December 2023.  Lactulose was prescribed for encephalopathy but for constipation.  She will be sent for preprocedure lab work and her procedure will be done at [facility] because of the possibility of repeat banding.  Her ASA classification is 3 and pending the results of the above lab work will be medically optimized for the proposed upper endoscopy and both the patient and her son who accompanied the patient today appeared to understand all of the above instructions, information, and management plan

## 2024-05-03 NOTE — PHYSICAL EXAM
[Alert] : alert [Normal Voice/Communication] : normal voice/communication [Healthy Appearing] : healthy appearing [No Acute Distress] : no acute distress [Sclera] : the sclera and conjunctiva were normal [Hearing Threshold Finger Rub Not [place]] : hearing was normal [Normal Lips/Gums] : the lips and gums were normal [Oropharynx] : the oropharynx was normal [Normal Appearance] : the appearance of the neck was normal [No Neck Mass] : no neck mass was observed [No Respiratory Distress] : no respiratory distress [No Acc Muscle Use] : no accessory muscle use [Respiration, Rhythm And Depth] : normal respiratory rhythm and effort [Auscultation Breath Sounds / Voice Sounds] : lungs were clear to auscultation bilaterally [Heart Rate And Rhythm] : heart rate was normal and rhythm regular [Normal S1, S2] : normal S1 and S2 [Murmurs] : no murmurs [Bowel Sounds] : normal bowel sounds [No Masses] : no abdominal mass palpated [Abdomen Soft] : soft [Epigastric] : epigastric [Oriented To Time, Place, And Person] : oriented to person, place, and time

## 2024-05-03 NOTE — HISTORY OF PRESENT ILLNESS
[de-identified] : 12/19/2023 Protruding lesions 4 cords of grade IV esophageal varices for which 5 bands were applied for hemostasis successfully, PHG 9/22/2022. Grade 1 esophageal varices with negative H. pylori biopsies. 8/3/2017. Grade 3 esophageal varices and duodenal varix. EGD 2013. Grade 1 varices. EGD 2013. Grade 1 varices.   [FreeTextEntry1] :  4/23/2021. 10 mm cecal polyp and rectal polyp and diverticulosis and internal hemorrhoids and rectal varices. 8/3/2017. Colon polyps diverticulosis internal hemorrhoids and rectal varices.   [de-identified] : 9/22/2022. 1.43 cm x 1.365 cm cystic lesion in the head of the pancreas. No FNA done. [de-identified] :  	 Bo Accession Number : [id number] Patient:     ALESHIA MORENO   Accession:                             [id number]  Collected Date/Time:                   12/19/2023 17:00 EST Received Date/Time:                    12/20/2023 06:02 EST  Surgical Pathology Report - Auth (Verified)  Specimen(s) Submitted 1  Biopsy random gastric  Final Diagnosis Gastric random, biopsy: - Gastric mucosa with mild chronic inactive gastritis. - Immunostains for H.Pylori are negative. Positive for intestinal metaplasia- mild and positive for atrophy-mild.   Note:controls for the immunostains are appropriate.  H. pylori test in its performance characteristics have been evaluated by [facility] laboratory.  The test has not been cleared both approved by the FDA.  FDA has determined that such clearance or approval is not necessary.  The laboratories regulated under the CLIA 1980 , as qualified to perform high complexity testing Verified by: Jesús Loredo MD (Electronic Signature) Reported on: 12/22/23 16:25 EST, [facility], [address] [place] [id number] _________________________________________________________________   Clinical Information R/O H pylori  Gross Description Received in formalin labeled   "biopsy random gastric"  are two, tan-pink, soft tissue fragments measuring 0.5 cm and 0.6 cm in greatest dimension. Entirely in one cassette: 1A.  Norma Newsome 12/20/2023 09:45 AM  Disclaimer In addition to other data that may appear on the specimen containers, all labels have been inspected to confirm the presence of the patient's name and date of birth.  Histological Processing Performed at [facility], Department of Pathology, [address] [place] [address].

## 2024-07-15 ENCOUNTER — APPOINTMENT (OUTPATIENT)
Dept: GASTROENTEROLOGY | Facility: HOSPITAL | Age: 74
End: 2024-07-15
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
## 2024-09-10 ENCOUNTER — APPOINTMENT (OUTPATIENT)
Dept: ORTHOPEDIC SURGERY | Facility: HOSPITAL | Age: 74
End: 2024-09-10

## 2024-09-19 ENCOUNTER — NON-APPOINTMENT (OUTPATIENT)
Age: 74
End: 2024-09-19

## 2024-10-02 ENCOUNTER — APPOINTMENT (OUTPATIENT)
Dept: ORTHOPEDIC SURGERY | Facility: CLINIC | Age: 74
End: 2024-10-02

## 2024-10-03 ENCOUNTER — APPOINTMENT (OUTPATIENT)
Dept: ORTHOPEDIC SURGERY | Facility: HOSPITAL | Age: 74
End: 2024-10-03

## 2024-10-25 ENCOUNTER — APPOINTMENT (OUTPATIENT)
Dept: ORTHOPEDIC SURGERY | Facility: CLINIC | Age: 74
End: 2024-10-25

## (undated) DEVICE — PACK IV START WITH CHG

## (undated) DEVICE — DENTURE CUP PINK

## (undated) DEVICE — DRSG CURITY GAUZE SPONGE 4 X 4" 12-PLY NON-STERILE

## (undated) DEVICE — TUBING IV EXTENSION MACRO W CLAVE 7"

## (undated) DEVICE — DRSG 2X2

## (undated) DEVICE — MASK PROC EAR LOOP

## (undated) DEVICE — GOWN IMPERV XL

## (undated) DEVICE — SYR IV FLUSH SALINE 10ML 30/TY

## (undated) DEVICE — SOL BAG NS 0.9% 1000ML

## (undated) DEVICE — SENSOR O2 FINGER ADULT

## (undated) DEVICE — VENODYNE/SCD SLEEVE CALF MEDIUM

## (undated) DEVICE — UNDERPAD LINEN SAVER 23 X 36"

## (undated) DEVICE — WARMING BLANKET FULL ADULT

## (undated) DEVICE — SOL IRR BAG NS 0.9% 1000ML

## (undated) DEVICE — TUBING ALARIS PUMP MODULE NON-DEHP

## (undated) DEVICE — CATH IV SAFE BC 22G X 1" (BLUE)

## (undated) DEVICE — SYR SLIP 10CC

## (undated) DEVICE — SYR LUER SLIP TIP 50CC

## (undated) DEVICE — BITE BLOCK ADULT 20 X 27MM (GREEN)

## (undated) DEVICE — SOL IRR BAG H2O 1000ML

## (undated) DEVICE — FORCEP RADIAL JAW 4 W NDL 2.4MM 2.8MM 240CM ORANGE DISP

## (undated) DEVICE — FORCEP BIOPSY ENDOSCOPIC 2.8MM DISP

## (undated) DEVICE — MASK PROCED EARLOOP 50/BX LRC COVID ADD

## (undated) DEVICE — VALVE ENDOSCOPE DEFENDO SINGLE USE

## (undated) DEVICE — STERIS DEFENDO 3-PIECE KIT (AIR/WATER, SUCTION & BIOPSY VALVES)

## (undated) DEVICE — KIT DEFENDO 4 OLY 4 PC